# Patient Record
Sex: FEMALE | Race: WHITE | NOT HISPANIC OR LATINO | Employment: OTHER | RURAL
[De-identification: names, ages, dates, MRNs, and addresses within clinical notes are randomized per-mention and may not be internally consistent; named-entity substitution may affect disease eponyms.]

---

## 2020-06-18 ENCOUNTER — HISTORICAL (OUTPATIENT)
Dept: ADMINISTRATIVE | Facility: HOSPITAL | Age: 70
End: 2020-06-18

## 2021-07-15 ENCOUNTER — OFFICE VISIT (OUTPATIENT)
Dept: FAMILY MEDICINE | Facility: CLINIC | Age: 71
End: 2021-07-15
Payer: MEDICARE

## 2021-07-15 VITALS
RESPIRATION RATE: 18 BRPM | BODY MASS INDEX: 28.53 KG/M2 | OXYGEN SATURATION: 99 % | HEIGHT: 63 IN | TEMPERATURE: 99 F | HEART RATE: 78 BPM | DIASTOLIC BLOOD PRESSURE: 72 MMHG | SYSTOLIC BLOOD PRESSURE: 123 MMHG | WEIGHT: 161 LBS

## 2021-07-15 DIAGNOSIS — I10 HYPERTENSION, UNSPECIFIED TYPE: Primary | ICD-10-CM

## 2021-07-15 DIAGNOSIS — L01.00 IMPETIGO: ICD-10-CM

## 2021-07-15 DIAGNOSIS — E78.5 HYPERLIPIDEMIA, UNSPECIFIED HYPERLIPIDEMIA TYPE: ICD-10-CM

## 2021-07-15 LAB
ALBUMIN SERPL BCP-MCNC: 4 G/DL (ref 3.5–5)
ALBUMIN/GLOB SERPL: 1.1 {RATIO}
ALP SERPL-CCNC: 69 U/L (ref 55–142)
ALT SERPL W P-5'-P-CCNC: 28 U/L (ref 13–56)
ANION GAP SERPL CALCULATED.3IONS-SCNC: 11 MMOL/L (ref 7–16)
AST SERPL W P-5'-P-CCNC: 23 U/L (ref 15–37)
BASOPHILS # BLD AUTO: 0.09 K/UL (ref 0–0.2)
BASOPHILS NFR BLD AUTO: 1 % (ref 0–1)
BILIRUB SERPL-MCNC: 0.6 MG/DL (ref 0–1.2)
BUN SERPL-MCNC: 14 MG/DL (ref 7–18)
BUN/CREAT SERPL: 12 (ref 6–20)
CALCIUM SERPL-MCNC: 9.4 MG/DL (ref 8.5–10.1)
CHLORIDE SERPL-SCNC: 103 MMOL/L (ref 98–107)
CHOLEST SERPL-MCNC: 134 MG/DL (ref 0–200)
CHOLEST/HDLC SERPL: 2.4 {RATIO}
CO2 SERPL-SCNC: 31 MMOL/L (ref 21–32)
CREAT SERPL-MCNC: 1.16 MG/DL (ref 0.55–1.02)
DIFFERENTIAL METHOD BLD: ABNORMAL
EOSINOPHIL # BLD AUTO: 0.18 K/UL (ref 0–0.5)
EOSINOPHIL NFR BLD AUTO: 2 % (ref 1–4)
ERYTHROCYTE [DISTWIDTH] IN BLOOD BY AUTOMATED COUNT: 12.9 % (ref 11.5–14.5)
GLOBULIN SER-MCNC: 3.6 G/DL (ref 2–4)
GLUCOSE SERPL-MCNC: 89 MG/DL (ref 74–106)
HCT VFR BLD AUTO: 42.4 % (ref 38–47)
HDLC SERPL-MCNC: 55 MG/DL (ref 40–60)
HGB BLD-MCNC: 14.2 G/DL (ref 12–16)
IMM GRANULOCYTES # BLD AUTO: 0.02 K/UL (ref 0–0.04)
IMM GRANULOCYTES NFR BLD: 0.2 % (ref 0–0.4)
LDLC SERPL CALC-MCNC: 49 MG/DL
LDLC/HDLC SERPL: 0.9 {RATIO}
LYMPHOCYTES # BLD AUTO: 2.54 K/UL (ref 1–4.8)
LYMPHOCYTES NFR BLD AUTO: 28.2 % (ref 27–41)
MCH RBC QN AUTO: 29.1 PG (ref 27–31)
MCHC RBC AUTO-ENTMCNC: 33.5 G/DL (ref 32–36)
MCV RBC AUTO: 86.9 FL (ref 80–96)
MONOCYTES # BLD AUTO: 0.83 K/UL (ref 0–0.8)
MONOCYTES NFR BLD AUTO: 9.2 % (ref 2–6)
MPC BLD CALC-MCNC: 12.2 FL (ref 9.4–12.4)
NEUTROPHILS # BLD AUTO: 5.35 K/UL (ref 1.8–7.7)
NEUTROPHILS NFR BLD AUTO: 59.4 % (ref 53–65)
NONHDLC SERPL-MCNC: 79 MG/DL
NRBC # BLD AUTO: 0 X10E3/UL
NRBC, AUTO (.00): 0 %
PLATELET # BLD AUTO: 218 K/UL (ref 150–400)
POTASSIUM SERPL-SCNC: 3.4 MMOL/L (ref 3.5–5.1)
PROT SERPL-MCNC: 7.6 G/DL (ref 6.4–8.2)
RBC # BLD AUTO: 4.88 M/UL (ref 4.2–5.4)
SODIUM SERPL-SCNC: 142 MMOL/L (ref 136–145)
TRIGL SERPL-MCNC: 152 MG/DL (ref 35–150)
VLDLC SERPL-MCNC: 30 MG/DL
WBC # BLD AUTO: 9.01 K/UL (ref 4.5–11)

## 2021-07-15 PROCEDURE — 99214 OFFICE O/P EST MOD 30 MIN: CPT | Mod: ,,, | Performed by: FAMILY MEDICINE

## 2021-07-15 PROCEDURE — 80061 LIPID PANEL: ICD-10-PCS | Mod: ,,, | Performed by: CLINICAL MEDICAL LABORATORY

## 2021-07-15 PROCEDURE — 85025 COMPLETE CBC W/AUTO DIFF WBC: CPT | Mod: ,,, | Performed by: CLINICAL MEDICAL LABORATORY

## 2021-07-15 PROCEDURE — 99214 PR OFFICE/OUTPT VISIT, EST, LEVL IV, 30-39 MIN: ICD-10-PCS | Mod: ,,, | Performed by: FAMILY MEDICINE

## 2021-07-15 PROCEDURE — 80053 COMPREHEN METABOLIC PANEL: CPT | Mod: ,,, | Performed by: CLINICAL MEDICAL LABORATORY

## 2021-07-15 PROCEDURE — 85025 CBC WITH DIFFERENTIAL: ICD-10-PCS | Mod: ,,, | Performed by: CLINICAL MEDICAL LABORATORY

## 2021-07-15 PROCEDURE — 80053 COMPREHENSIVE METABOLIC PANEL: ICD-10-PCS | Mod: ,,, | Performed by: CLINICAL MEDICAL LABORATORY

## 2021-07-15 PROCEDURE — 80061 LIPID PANEL: CPT | Mod: ,,, | Performed by: CLINICAL MEDICAL LABORATORY

## 2021-07-15 RX ORDER — CHLORTHALIDONE 25 MG/1
25 TABLET ORAL EVERY MORNING
COMMUNITY
Start: 2021-04-11 | End: 2021-07-15 | Stop reason: SDUPTHER

## 2021-07-15 RX ORDER — CHLORTHALIDONE 25 MG/1
25 TABLET ORAL EVERY MORNING
Qty: 90 TABLET | Refills: 1 | Status: SHIPPED | OUTPATIENT
Start: 2021-07-15 | End: 2022-01-19 | Stop reason: SDUPTHER

## 2021-07-15 RX ORDER — ROSUVASTATIN CALCIUM 10 MG/1
10 TABLET, COATED ORAL NIGHTLY
COMMUNITY
Start: 2021-04-11 | End: 2021-07-15 | Stop reason: SDUPTHER

## 2021-07-15 RX ORDER — SULFAMETHOXAZOLE AND TRIMETHOPRIM 800; 160 MG/1; MG/1
1 TABLET ORAL 2 TIMES DAILY
Qty: 20 TABLET | Refills: 0 | Status: SHIPPED | OUTPATIENT
Start: 2021-07-15 | End: 2022-01-19

## 2021-07-15 RX ORDER — ROSUVASTATIN CALCIUM 10 MG/1
10 TABLET, COATED ORAL NIGHTLY
Qty: 90 TABLET | Refills: 1 | Status: SHIPPED | OUTPATIENT
Start: 2021-07-15 | End: 2022-01-19 | Stop reason: SDUPTHER

## 2022-01-19 ENCOUNTER — OFFICE VISIT (OUTPATIENT)
Dept: FAMILY MEDICINE | Facility: CLINIC | Age: 72
End: 2022-01-19
Payer: MEDICARE

## 2022-01-19 VITALS
DIASTOLIC BLOOD PRESSURE: 73 MMHG | TEMPERATURE: 99 F | BODY MASS INDEX: 28.89 KG/M2 | SYSTOLIC BLOOD PRESSURE: 134 MMHG | HEIGHT: 62 IN | OXYGEN SATURATION: 97 % | WEIGHT: 157 LBS | RESPIRATION RATE: 20 BRPM | HEART RATE: 83 BPM

## 2022-01-19 DIAGNOSIS — I10 HYPERTENSION, UNSPECIFIED TYPE: ICD-10-CM

## 2022-01-19 DIAGNOSIS — I10 PRIMARY HYPERTENSION: Primary | ICD-10-CM

## 2022-01-19 DIAGNOSIS — E78.5 HYPERLIPIDEMIA, UNSPECIFIED HYPERLIPIDEMIA TYPE: ICD-10-CM

## 2022-01-19 DIAGNOSIS — E78.2 MIXED HYPERLIPIDEMIA: ICD-10-CM

## 2022-01-19 DIAGNOSIS — Z12.31 ENCOUNTER FOR SCREENING MAMMOGRAM FOR MALIGNANT NEOPLASM OF BREAST: ICD-10-CM

## 2022-01-19 LAB
ALBUMIN SERPL BCP-MCNC: 3.1 G/DL (ref 3.5–5)
ALBUMIN/GLOB SERPL: 0.8 {RATIO}
ALP SERPL-CCNC: 65 U/L (ref 55–142)
ALT SERPL W P-5'-P-CCNC: 33 U/L (ref 13–56)
ANION GAP SERPL CALCULATED.3IONS-SCNC: 5 MMOL/L (ref 7–16)
AST SERPL W P-5'-P-CCNC: 28 U/L (ref 15–37)
BASOPHILS # BLD AUTO: 0.05 K/UL (ref 0–0.2)
BASOPHILS NFR BLD AUTO: 0.7 % (ref 0–1)
BILIRUB SERPL-MCNC: 0.6 MG/DL (ref 0–1.2)
BUN SERPL-MCNC: 15 MG/DL (ref 7–18)
BUN/CREAT SERPL: 17 (ref 6–20)
CALCIUM SERPL-MCNC: 9.1 MG/DL (ref 8.5–10.1)
CHLORIDE SERPL-SCNC: 101 MMOL/L (ref 98–107)
CHOLEST SERPL-MCNC: 124 MG/DL (ref 0–200)
CHOLEST/HDLC SERPL: 3.1 {RATIO}
CO2 SERPL-SCNC: 36 MMOL/L (ref 21–32)
CREAT SERPL-MCNC: 0.89 MG/DL (ref 0.55–1.02)
DIFFERENTIAL METHOD BLD: ABNORMAL
EOSINOPHIL # BLD AUTO: 0.11 K/UL (ref 0–0.5)
EOSINOPHIL NFR BLD AUTO: 1.5 % (ref 1–4)
ERYTHROCYTE [DISTWIDTH] IN BLOOD BY AUTOMATED COUNT: 12.7 % (ref 11.5–14.5)
GLOBULIN SER-MCNC: 3.9 G/DL (ref 2–4)
GLUCOSE SERPL-MCNC: 131 MG/DL (ref 74–106)
HCT VFR BLD AUTO: 40.8 % (ref 38–47)
HDLC SERPL-MCNC: 40 MG/DL (ref 40–60)
HGB BLD-MCNC: 13.8 G/DL (ref 12–16)
IMM GRANULOCYTES # BLD AUTO: 0.03 K/UL (ref 0–0.04)
IMM GRANULOCYTES NFR BLD: 0.4 % (ref 0–0.4)
LDLC SERPL CALC-MCNC: 42 MG/DL
LDLC/HDLC SERPL: 1.1 {RATIO}
LYMPHOCYTES # BLD AUTO: 1.91 K/UL (ref 1–4.8)
LYMPHOCYTES NFR BLD AUTO: 25.4 % (ref 27–41)
MCH RBC QN AUTO: 29.2 PG (ref 27–31)
MCHC RBC AUTO-ENTMCNC: 33.8 G/DL (ref 32–36)
MCV RBC AUTO: 86.3 FL (ref 80–96)
MONOCYTES # BLD AUTO: 0.66 K/UL (ref 0–0.8)
MONOCYTES NFR BLD AUTO: 8.8 % (ref 2–6)
MPC BLD CALC-MCNC: 12.7 FL (ref 9.4–12.4)
NEUTROPHILS # BLD AUTO: 4.76 K/UL (ref 1.8–7.7)
NEUTROPHILS NFR BLD AUTO: 63.2 % (ref 53–65)
NONHDLC SERPL-MCNC: 84 MG/DL
NRBC # BLD AUTO: 0 X10E3/UL
NRBC, AUTO (.00): 0 %
PLATELET # BLD AUTO: 214 K/UL (ref 150–400)
POTASSIUM SERPL-SCNC: 3.2 MMOL/L (ref 3.5–5.1)
PROT SERPL-MCNC: 7 G/DL (ref 6.4–8.2)
RBC # BLD AUTO: 4.73 M/UL (ref 4.2–5.4)
SODIUM SERPL-SCNC: 139 MMOL/L (ref 136–145)
TRIGL SERPL-MCNC: 210 MG/DL (ref 35–150)
VLDLC SERPL-MCNC: 42 MG/DL
WBC # BLD AUTO: 7.52 K/UL (ref 4.5–11)

## 2022-01-19 PROCEDURE — 85025 CBC WITH DIFFERENTIAL: ICD-10-PCS | Mod: ,,, | Performed by: CLINICAL MEDICAL LABORATORY

## 2022-01-19 PROCEDURE — 99214 OFFICE O/P EST MOD 30 MIN: CPT | Mod: ,,, | Performed by: FAMILY MEDICINE

## 2022-01-19 PROCEDURE — 80061 LIPID PANEL: CPT | Mod: ,,, | Performed by: CLINICAL MEDICAL LABORATORY

## 2022-01-19 PROCEDURE — 80053 COMPREHENSIVE METABOLIC PANEL: ICD-10-PCS | Mod: ,,, | Performed by: CLINICAL MEDICAL LABORATORY

## 2022-01-19 PROCEDURE — 99214 PR OFFICE/OUTPT VISIT, EST, LEVL IV, 30-39 MIN: ICD-10-PCS | Mod: ,,, | Performed by: FAMILY MEDICINE

## 2022-01-19 PROCEDURE — 80053 COMPREHEN METABOLIC PANEL: CPT | Mod: ,,, | Performed by: CLINICAL MEDICAL LABORATORY

## 2022-01-19 PROCEDURE — 85025 COMPLETE CBC W/AUTO DIFF WBC: CPT | Mod: ,,, | Performed by: CLINICAL MEDICAL LABORATORY

## 2022-01-19 PROCEDURE — 80061 LIPID PANEL: ICD-10-PCS | Mod: ,,, | Performed by: CLINICAL MEDICAL LABORATORY

## 2022-01-19 RX ORDER — CHLORTHALIDONE 25 MG/1
25 TABLET ORAL EVERY MORNING
Qty: 90 TABLET | Refills: 1 | Status: CANCELLED | OUTPATIENT
Start: 2022-01-19

## 2022-01-19 RX ORDER — ROSUVASTATIN CALCIUM 10 MG/1
10 TABLET, COATED ORAL NIGHTLY
Qty: 90 TABLET | Refills: 1 | Status: SHIPPED | OUTPATIENT
Start: 2022-01-19 | End: 2022-07-14 | Stop reason: SDUPTHER

## 2022-01-19 RX ORDER — ROSUVASTATIN CALCIUM 10 MG/1
10 TABLET, COATED ORAL NIGHTLY
Qty: 90 TABLET | Refills: 1 | Status: CANCELLED | OUTPATIENT
Start: 2022-01-19

## 2022-01-19 RX ORDER — CHLORTHALIDONE 25 MG/1
25 TABLET ORAL EVERY MORNING
Qty: 90 TABLET | Refills: 1 | Status: SHIPPED | OUTPATIENT
Start: 2022-01-19 | End: 2022-07-14 | Stop reason: SDUPTHER

## 2022-01-19 NOTE — PROGRESS NOTES
New Clinic Note    Dayana Bae is a 71 y.o. female     CC:   Chief Complaint   Patient presents with    Annual Exam    Hypertension    Hyperlipidemia    Follow-up     Here for her 6 month follow up appointment , fasting labs and prescriptions.        Subjective    History of Present Illness HPI        Presents to clinic for follow up on chronic health problems. She needs refills.     Hypertension:    Takes medications as directed: yes  Checks blood pressure outside of clinic: yes  On a statin: no  Cardiovascular exercise: yes  Heart healthy diet: no        Current Outpatient Medications:     chlorthalidone (HYGROTEN) 25 MG Tab, Take 1 tablet (25 mg total) by mouth every morning., Disp: 90 tablet, Rfl: 1    rosuvastatin (CRESTOR) 10 MG tablet, Take 1 tablet (10 mg total) by mouth every evening., Disp: 90 tablet, Rfl: 1     Past Medical History:   Diagnosis Date    Cataract     Hyperlipidemia     Hypertension         Family History   Problem Relation Age of Onset    Heart disease Mother     Heart disease Father     Hypertension Sister     Hypertension Brother         Past Surgical History:   Procedure Laterality Date    CARPAL TUNNEL RELEASE      TUBAL LIGATION          Social History     Socioeconomic History    Marital status:     Number of children: 2   Occupational History    Occupation: retired from [employer]   Tobacco Use    Smoking status: Never Smoker    Smokeless tobacco: Never Used   Substance and Sexual Activity    Alcohol use: Never    Drug use: Never    Sexual activity: Not Currently     Partners: Male        Review of Systems   Constitutional: Negative for activity change, appetite change, chills, fatigue and fever.   HENT: Negative for nasal congestion, ear pain, hearing loss, postnasal drip and sore throat.    Respiratory: Negative for cough, chest tightness, shortness of breath and wheezing.    Cardiovascular: Negative for chest pain, palpitations, leg swelling  "and claudication.   Gastrointestinal: Negative for abdominal pain, change in bowel habit, constipation, diarrhea, nausea, vomiting and change in bowel habit.   Genitourinary: Negative for dysuria.   Musculoskeletal: Negative for arthralgias, back pain, gait problem and myalgias.   Integumentary:  Negative for rash.   Neurological: Negative for weakness and headaches.   Psychiatric/Behavioral: Negative for suicidal ideas. The patient is not nervous/anxious.         /73   Pulse 83   Temp 99.4 °F (37.4 °C) (Oral)   Resp 20   Ht 5' 2" (1.575 m)   Wt 71.2 kg (157 lb)   SpO2 97%   BMI 28.72 kg/m²      Physical Exam  Vitals and nursing note reviewed.   Constitutional:       General: She is not in acute distress.     Appearance: Normal appearance. She is not ill-appearing.   Eyes:      Extraocular Movements: Extraocular movements intact.      Pupils: Pupils are equal, round, and reactive to light.   Cardiovascular:      Rate and Rhythm: Normal rate and regular rhythm.      Heart sounds: Normal heart sounds.   Pulmonary:      Effort: Pulmonary effort is normal.      Breath sounds: Normal breath sounds.   Abdominal:      General: Bowel sounds are normal.      Palpations: Abdomen is soft.   Musculoskeletal:         General: Normal range of motion.   Skin:     Findings: No rash.   Neurological:      General: No focal deficit present.      Mental Status: She is alert and oriented to person, place, and time. Mental status is at baseline.   Psychiatric:         Mood and Affect: Mood normal.         Behavior: Behavior normal.          Assessment and Plan      ICD-10-CM ICD-9-CM   1. Primary hypertension  I10 401.9   2. Mixed hyperlipidemia  E78.2 272.2   3. Encounter for screening mammogram for malignant neoplasm of breast  Z12.31 V76.12        Problem List Items Addressed This Visit        Cardiac/Vascular    Hypertension - Primary    Relevant Medications    chlorthalidone (HYGROTEN) 25 MG Tab    Other Relevant Orders "    Comprehensive Metabolic Panel    CBC Auto Differential    Lipid Panel    Hyperlipidemia    Relevant Medications    rosuvastatin (CRESTOR) 10 MG tablet      Other Visit Diagnoses     Encounter for screening mammogram for malignant neoplasm of breast        Relevant Orders    Mammo Digital Screening Bilat           Patient Instructions   1. Take Medications as directed  2. Monitor blood pressure outside of clinic  3. Eat a heart healthy diet and get plenty of cardiovascular exercise.          Follow up in about 6 months (around 7/19/2022).

## 2022-01-27 LAB — BCS RECOMMENDATION EXT: NORMAL

## 2022-02-07 DIAGNOSIS — R73.9 HYPERGLYCEMIA: Primary | ICD-10-CM

## 2022-03-11 DIAGNOSIS — Z71.89 COMPLEX CARE COORDINATION: ICD-10-CM

## 2022-03-16 ENCOUNTER — OFFICE VISIT (OUTPATIENT)
Dept: FAMILY MEDICINE | Facility: CLINIC | Age: 72
End: 2022-03-16
Payer: MEDICARE

## 2022-03-16 VITALS
DIASTOLIC BLOOD PRESSURE: 77 MMHG | SYSTOLIC BLOOD PRESSURE: 137 MMHG | BODY MASS INDEX: 27.71 KG/M2 | OXYGEN SATURATION: 98 % | WEIGHT: 156.38 LBS | RESPIRATION RATE: 20 BRPM | TEMPERATURE: 98 F | HEART RATE: 88 BPM | HEIGHT: 63 IN

## 2022-03-16 DIAGNOSIS — J40 BRONCHITIS: Primary | ICD-10-CM

## 2022-03-16 DIAGNOSIS — R52 BODY ACHES: ICD-10-CM

## 2022-03-16 DIAGNOSIS — R09.81 NASAL CONGESTION: ICD-10-CM

## 2022-03-16 DIAGNOSIS — R05.9 COUGH: ICD-10-CM

## 2022-03-16 DIAGNOSIS — R50.9 FEVER, UNSPECIFIED FEVER CAUSE: ICD-10-CM

## 2022-03-16 DIAGNOSIS — R51.9 NONINTRACTABLE HEADACHE, UNSPECIFIED CHRONICITY PATTERN, UNSPECIFIED HEADACHE TYPE: ICD-10-CM

## 2022-03-16 LAB
CTP QC/QA: YES
FLUAV AG NPH QL: NEGATIVE
FLUBV AG NPH QL: NEGATIVE
SARS-COV-2 AG RESP QL IA.RAPID: NEGATIVE

## 2022-03-16 PROCEDURE — 87428 SARSCOV & INF VIR A&B AG IA: CPT | Mod: RHCUB | Performed by: NURSE PRACTITIONER

## 2022-03-16 PROCEDURE — 96372 PR INJECTION,THERAP/PROPH/DIAG2ST, IM OR SUBCUT: ICD-10-PCS | Mod: ,,, | Performed by: NURSE PRACTITIONER

## 2022-03-16 PROCEDURE — 99214 PR OFFICE/OUTPT VISIT, EST, LEVL IV, 30-39 MIN: ICD-10-PCS | Mod: ,,, | Performed by: NURSE PRACTITIONER

## 2022-03-16 PROCEDURE — 99214 OFFICE O/P EST MOD 30 MIN: CPT | Mod: ,,, | Performed by: NURSE PRACTITIONER

## 2022-03-16 PROCEDURE — 96372 THER/PROPH/DIAG INJ SC/IM: CPT | Mod: ,,, | Performed by: NURSE PRACTITIONER

## 2022-03-16 RX ORDER — CEFTRIAXONE 1 G/1
1 INJECTION, POWDER, FOR SOLUTION INTRAMUSCULAR; INTRAVENOUS
Status: COMPLETED | OUTPATIENT
Start: 2022-03-16 | End: 2022-03-16

## 2022-03-16 RX ORDER — ALBUTEROL SULFATE 90 UG/1
2 AEROSOL, METERED RESPIRATORY (INHALATION) EVERY 6 HOURS PRN
Qty: 18 G | Refills: 0 | Status: SHIPPED | OUTPATIENT
Start: 2022-03-16 | End: 2022-07-14

## 2022-03-16 RX ORDER — AZITHROMYCIN 250 MG/1
TABLET, FILM COATED ORAL
Qty: 6 TABLET | Refills: 0 | Status: SHIPPED | OUTPATIENT
Start: 2022-03-16 | End: 2022-07-14

## 2022-03-16 RX ORDER — BETAMETHASONE SODIUM PHOSPHATE AND BETAMETHASONE ACETATE 3; 3 MG/ML; MG/ML
6 INJECTION, SUSPENSION INTRA-ARTICULAR; INTRALESIONAL; INTRAMUSCULAR; SOFT TISSUE
Status: COMPLETED | OUTPATIENT
Start: 2022-03-16 | End: 2022-03-16

## 2022-03-16 RX ADMIN — CEFTRIAXONE 1 G: 1 INJECTION, POWDER, FOR SOLUTION INTRAMUSCULAR; INTRAVENOUS at 11:03

## 2022-03-16 RX ADMIN — BETAMETHASONE SODIUM PHOSPHATE AND BETAMETHASONE ACETATE 6 MG: 3; 3 INJECTION, SUSPENSION INTRA-ARTICULAR; INTRALESIONAL; INTRAMUSCULAR; SOFT TISSUE at 10:03

## 2022-03-16 NOTE — PROGRESS NOTES
Sammi Butt DNP, MARIO    [address] [place] [address]     PATIENT NAME: Dayana Bae  : 1950  DATE: 3/16/22  MRN: [id number]      Billing Provider: Sammi Butt DNP, FNP  Level of Service:   Patient PCP Information     Provider PCP Type    Meghna Ramires MD General          Reason for Visit / Chief Complaint: Cough, Nasal Congestion, and Fatigue (Symptoms started about a week ago. Complain of dry non productive cough, nasal congestion. Patient states she had a negative home covid test.)       Update PCP  Update Chief Complaint         History of Present Illness / Problem Focused Workflow     Dayana Bae presents to the clinic with Cough, Nasal Congestion, and Fatigue (Symptoms started about a week ago. Complain of dry non productive cough, nasal congestion. Patient states she had a negative home covid test.)     Pt c/o dry cough and fatigue x 1 week. Pt c/o nasal congestion. Denies any fever.       Review of Systems     Review of Systems   Constitutional: Positive for fatigue. Negative for appetite change, fever and unexpected weight change.   HENT: Positive for nasal congestion. Negative for hearing loss.    Eyes: Negative for visual disturbance.   Respiratory: Positive for cough. Negative for shortness of breath.    Cardiovascular: Negative for chest pain.   Gastrointestinal: Negative for abdominal pain, constipation, diarrhea, nausea and vomiting.   Genitourinary: Negative for dysuria.   Musculoskeletal: Negative for back pain.   Psychiatric/Behavioral: Negative for sleep disturbance.        Medical / Social / Family History     Past Medical History:   Diagnosis Date    Cataract     Hyperlipidemia     Hypertension        Past Surgical History:   Procedure Laterality Date    CARPAL TUNNEL RELEASE      TUBAL LIGATION         Social History  Ms. Dayana Bae  reports that she has never smoked. She has never used smokeless  tobacco. She reports that she does not drink alcohol and does not use drugs.    Family History  Ms. Dayana Bae's family history includes Heart disease in her father and mother; Hypertension in her brother and sister.    Medications and Allergies     Medications  Outpatient Medications Marked as Taking for the 3/16/22 encounter (Office Visit) with Sammi Butt DNP, FNP   Medication Sig Dispense Refill    chlorthalidone (HYGROTEN) 25 MG Tab Take 1 tablet (25 mg total) by mouth every morning. 90 tablet 1    rosuvastatin (CRESTOR) 10 MG tablet Take 1 tablet (10 mg total) by mouth every evening. 90 tablet 1     Current Facility-Administered Medications for the 3/16/22 encounter (Office Visit) with Sammi Butt DNP, FNP   Medication Dose Route Frequency Provider Last Rate Last Admin    betamethasone acetate-betamethasone sodium phosphate injection 6 mg  6 mg Intramuscular 1 time in Clinic/HOD Sammi Butt DNP, FNP        cefTRIAXone injection 1 g  1 g Intramuscular 1 time in Clinic/HOD Sammi Butt DNP, FNP           Allergies  Review of patient's allergies indicates:  No Known Allergies    Physical Examination     Vitals:    03/16/22 0942   BP: 137/77   Pulse: 88   Resp: 20   Temp: 98.3 °F (36.8 °C)     Physical Exam  Vitals and nursing note reviewed.   Constitutional:       General: She is not in acute distress.  HENT:      Nose: Nose normal.      Mouth/Throat:      Mouth: Mucous membranes are moist.   Eyes:      Pupils: Pupils are equal, round, and reactive to light.   Cardiovascular:      Rate and Rhythm: Normal rate and regular rhythm.      Pulses: Normal pulses.      Heart sounds: Normal heart sounds. No murmur heard.  Pulmonary:      Effort: Pulmonary effort is normal. No respiratory distress.      Breath sounds: Examination of the left-upper field reveals rhonchi. Examination of the left-middle field reveals rhonchi. Rhonchi present. No wheezing or rales.   Chest:      Chest wall: No  tenderness.   Abdominal:      General: Bowel sounds are normal.      Palpations: Abdomen is soft.   Musculoskeletal:         General: Normal range of motion.      Cervical back: Normal range of motion and neck supple.      Right lower leg: No edema.      Left lower leg: No edema.   Skin:     General: Skin is warm and dry.   Neurological:      General: No focal deficit present.      Mental Status: She is alert and oriented to person, place, and time.          Assessment and Plan (including Health Maintenance)      Problem List  [employer]  Document Outside HM   :    Plan:         Health Maintenance Due   Topic Date Due    Mammogram  Never done    Pneumococcal Vaccines (Age 65+) (1 of 1 - PPSV23) Never done       Problem List Items Addressed This Visit    None     Visit Diagnoses     Bronchitis    -  Primary    Relevant Medications    cefTRIAXone injection 1 g    betamethasone acetate-betamethasone sodium phosphate injection 6 mg    azithromycin (ZITHROMAX Z-NATHANAEL) 250 MG tablet    albuterol (VENTOLIN HFA) 90 mcg/actuation inhaler    Cough        Relevant Orders    POCT SARS-COV2 (COVID) with Flu Antigen (Completed)    Nasal congestion        Relevant Orders    POCT SARS-COV2 (COVID) with Flu Antigen (Completed)    Nonintractable headache, unspecified chronicity pattern, unspecified headache type        Relevant Orders    POCT SARS-COV2 (COVID) with Flu Antigen (Completed)    Body aches        Relevant Orders    POCT SARS-COV2 (COVID) with Flu Antigen (Completed)    Fever, unspecified fever cause        Relevant Orders    POCT SARS-COV2 (COVID) with Flu Antigen (Completed)        Bronchitis  -     cefTRIAXone injection 1 g  -     betamethasone acetate-betamethasone sodium phosphate injection 6 mg  -     azithromycin (ZITHROMAX Z-NATHANAEL) 250 MG tablet; Take 2 tablets on Day 1 and 1 tablet daily for next 4 days.  Dispense: 6 tablet; Refill: 0  -     albuterol (VENTOLIN HFA) 90 mcg/actuation inhaler; Inhale 2 puffs into the  lungs every 6 (six) hours as needed for Wheezing. Rescue  Dispense: 18 g; Refill: 0    Cough  -     POCT SARS-COV2 (COVID) with Flu Antigen    Nasal congestion  -     POCT SARS-COV2 (COVID) with Flu Antigen    Nonintractable headache, unspecified chronicity pattern, unspecified headache type  -     POCT SARS-COV2 (COVID) with Flu Antigen    Body aches  -     POCT SARS-COV2 (COVID) with Flu Antigen    Fever, unspecified fever cause  -     POCT SARS-COV2 (COVID) with Flu Antigen       Health Maintenance Topics with due status: Not Due       Topic Last Completion Date    Lipid Panel 01/19/2022    DEXA Scan Not Due           Future Appointments   Date Time Provider Department Center   7/20/2022  8:00 AM Meghna Ramires MD [facility]        Follow up if symptoms worsen or fail to improve.     Signature:  Sammi Butt DNP, FNP  [address] [place] [address]  Phone #: [phone]  Fax #: [phone]    Date of encounter: 3/16/22    Patient Instructions   Take meds as prescribed. Use inhaler every 4 hours for next 24 hours while awake and then use as needed. Increase fluid intake. Follow up for any worsening in symptoms or no improvement with current meds.

## 2022-03-16 NOTE — PATIENT INSTRUCTIONS
Take meds as prescribed. Use inhaler every 4 hours for next 24 hours while awake and then use as needed. Increase fluid intake. Follow up for any worsening in symptoms or no improvement with current meds.

## 2022-07-14 ENCOUNTER — OFFICE VISIT (OUTPATIENT)
Dept: FAMILY MEDICINE | Facility: CLINIC | Age: 72
End: 2022-07-14
Payer: MEDICARE

## 2022-07-14 VITALS
BODY MASS INDEX: 28.85 KG/M2 | RESPIRATION RATE: 20 BRPM | SYSTOLIC BLOOD PRESSURE: 157 MMHG | HEIGHT: 63 IN | HEART RATE: 73 BPM | OXYGEN SATURATION: 99 % | WEIGHT: 162.81 LBS | DIASTOLIC BLOOD PRESSURE: 78 MMHG

## 2022-07-14 DIAGNOSIS — M81.0 AGE-RELATED OSTEOPOROSIS WITHOUT CURRENT PATHOLOGICAL FRACTURE: Chronic | ICD-10-CM

## 2022-07-14 DIAGNOSIS — I10 PRIMARY HYPERTENSION: Primary | ICD-10-CM

## 2022-07-14 DIAGNOSIS — E78.2 MIXED HYPERLIPIDEMIA: ICD-10-CM

## 2022-07-14 PROBLEM — E78.5 HYPERLIPIDEMIA: Chronic | Status: ACTIVE | Noted: 2021-07-15

## 2022-07-14 LAB
ALBUMIN SERPL BCP-MCNC: 4.3 G/DL (ref 3.5–5)
ALBUMIN/GLOB SERPL: 1.3 {RATIO}
ALP SERPL-CCNC: 61 U/L (ref 55–142)
ALT SERPL W P-5'-P-CCNC: 27 U/L (ref 13–56)
ANION GAP SERPL CALCULATED.3IONS-SCNC: 13 MMOL/L (ref 7–16)
AST SERPL W P-5'-P-CCNC: 20 U/L (ref 15–37)
BASOPHILS # BLD AUTO: 0.08 K/UL (ref 0–0.2)
BASOPHILS NFR BLD AUTO: 1.2 % (ref 0–1)
BILIRUB SERPL-MCNC: 0.8 MG/DL (ref 0–1.2)
BUN SERPL-MCNC: 10 MG/DL (ref 7–18)
BUN/CREAT SERPL: 10 (ref 6–20)
CALCIUM SERPL-MCNC: 9.8 MG/DL (ref 8.5–10.1)
CHLORIDE SERPL-SCNC: 103 MMOL/L (ref 98–107)
CHOLEST SERPL-MCNC: 158 MG/DL (ref 0–200)
CHOLEST/HDLC SERPL: 2.6 {RATIO}
CO2 SERPL-SCNC: 30 MMOL/L (ref 21–32)
CREAT SERPL-MCNC: 1 MG/DL (ref 0.55–1.02)
CREAT UR-MCNC: 58 MG/DL (ref 28–219)
DIFFERENTIAL METHOD BLD: ABNORMAL
EOSINOPHIL # BLD AUTO: 0.16 K/UL (ref 0–0.5)
EOSINOPHIL NFR BLD AUTO: 2.4 % (ref 1–4)
ERYTHROCYTE [DISTWIDTH] IN BLOOD BY AUTOMATED COUNT: 13.2 % (ref 11.5–14.5)
GLOBULIN SER-MCNC: 3.4 G/DL (ref 2–4)
GLUCOSE SERPL-MCNC: 100 MG/DL (ref 74–106)
HCT VFR BLD AUTO: 45.5 % (ref 38–47)
HDLC SERPL-MCNC: 61 MG/DL (ref 40–60)
HGB BLD-MCNC: 15.1 G/DL (ref 12–16)
IMM GRANULOCYTES # BLD AUTO: 0.01 K/UL (ref 0–0.04)
IMM GRANULOCYTES NFR BLD: 0.2 % (ref 0–0.4)
LDLC SERPL CALC-MCNC: 52 MG/DL
LDLC/HDLC SERPL: 0.9 {RATIO}
LYMPHOCYTES # BLD AUTO: 2.89 K/UL (ref 1–4.8)
LYMPHOCYTES NFR BLD AUTO: 44.2 % (ref 27–41)
MCH RBC QN AUTO: 29.3 PG (ref 27–31)
MCHC RBC AUTO-ENTMCNC: 33.2 G/DL (ref 32–36)
MCV RBC AUTO: 88.2 FL (ref 80–96)
MICROALBUMIN UR-MCNC: <0.5 MG/DL (ref 0–2.8)
MICROALBUMIN/CREAT RATIO PNL UR: <8.6 MG/G (ref 0–30)
MONOCYTES # BLD AUTO: 0.61 K/UL (ref 0–0.8)
MONOCYTES NFR BLD AUTO: 9.3 % (ref 2–6)
MPC BLD CALC-MCNC: 11.9 FL (ref 9.4–12.4)
NEUTROPHILS # BLD AUTO: 2.79 K/UL (ref 1.8–7.7)
NEUTROPHILS NFR BLD AUTO: 42.7 % (ref 53–65)
NONHDLC SERPL-MCNC: 97 MG/DL
NRBC # BLD AUTO: 0 X10E3/UL
NRBC, AUTO (.00): 0 %
PLATELET # BLD AUTO: 218 K/UL (ref 150–400)
POTASSIUM SERPL-SCNC: 3.5 MMOL/L (ref 3.5–5.1)
PROT SERPL-MCNC: 7.7 G/DL (ref 6.4–8.2)
RBC # BLD AUTO: 5.16 M/UL (ref 4.2–5.4)
SODIUM SERPL-SCNC: 142 MMOL/L (ref 136–145)
TRIGL SERPL-MCNC: 225 MG/DL (ref 35–150)
VLDLC SERPL-MCNC: 45 MG/DL
WBC # BLD AUTO: 6.54 K/UL (ref 4.5–11)

## 2022-07-14 PROCEDURE — 82043 UR ALBUMIN QUANTITATIVE: CPT | Mod: ,,, | Performed by: CLINICAL MEDICAL LABORATORY

## 2022-07-14 PROCEDURE — 81003 URINALYSIS AUTO W/O SCOPE: CPT | Mod: RHCUB | Performed by: NURSE PRACTITIONER

## 2022-07-14 PROCEDURE — 82043 MICROALBUMIN / CREATININE RATIO URINE: ICD-10-PCS | Mod: ,,, | Performed by: CLINICAL MEDICAL LABORATORY

## 2022-07-14 PROCEDURE — 80061 LIPID PANEL: CPT | Mod: ,,, | Performed by: CLINICAL MEDICAL LABORATORY

## 2022-07-14 PROCEDURE — 85025 COMPLETE CBC W/AUTO DIFF WBC: CPT | Mod: ,,, | Performed by: CLINICAL MEDICAL LABORATORY

## 2022-07-14 PROCEDURE — 80061 LIPID PANEL: ICD-10-PCS | Mod: ,,, | Performed by: CLINICAL MEDICAL LABORATORY

## 2022-07-14 PROCEDURE — 80053 COMPREHENSIVE METABOLIC PANEL: ICD-10-PCS | Mod: ,,, | Performed by: CLINICAL MEDICAL LABORATORY

## 2022-07-14 PROCEDURE — 82570 MICROALBUMIN / CREATININE RATIO URINE: ICD-10-PCS | Mod: ,,, | Performed by: CLINICAL MEDICAL LABORATORY

## 2022-07-14 PROCEDURE — 99214 PR OFFICE/OUTPT VISIT, EST, LEVL IV, 30-39 MIN: ICD-10-PCS | Mod: ,,, | Performed by: NURSE PRACTITIONER

## 2022-07-14 PROCEDURE — 99214 OFFICE O/P EST MOD 30 MIN: CPT | Mod: ,,, | Performed by: NURSE PRACTITIONER

## 2022-07-14 PROCEDURE — 82570 ASSAY OF URINE CREATININE: CPT | Mod: ,,, | Performed by: CLINICAL MEDICAL LABORATORY

## 2022-07-14 PROCEDURE — 80053 COMPREHEN METABOLIC PANEL: CPT | Mod: ,,, | Performed by: CLINICAL MEDICAL LABORATORY

## 2022-07-14 PROCEDURE — 85025 CBC WITH DIFFERENTIAL: ICD-10-PCS | Mod: ,,, | Performed by: CLINICAL MEDICAL LABORATORY

## 2022-07-14 RX ORDER — ROSUVASTATIN CALCIUM 10 MG/1
10 TABLET, COATED ORAL NIGHTLY
Qty: 90 TABLET | Refills: 1 | Status: SHIPPED | OUTPATIENT
Start: 2022-07-14 | End: 2023-01-18

## 2022-07-14 RX ORDER — CHLORTHALIDONE 25 MG/1
25 TABLET ORAL EVERY MORNING
Qty: 90 TABLET | Refills: 1 | Status: SHIPPED | OUTPATIENT
Start: 2022-07-14 | End: 2023-01-18

## 2022-07-14 NOTE — PATIENT INSTRUCTIONS
Continue current medication regimen. Will call pt with lab results. Recommend monthly breast exams. Recommend exercise 30 minutes per day most days of the week. Follow up in 6 months for chronic medical problems. Follow up as needed.

## 2022-07-14 NOTE — PROGRESS NOTES
Sammi Butt DNP, FNP    [address] [place] [address]     PATIENT NAME: Dayana Bae  : 1950  DATE: 22  MRN: [id number]      Billing Provider: Sammi Butt DNP, FNP  Level of Service:   Patient PCP Information     Provider PCP Type    Sammi Butt DNP, FNP General          Reason for Visit / Chief Complaint: Medication Refill and Follow-up       Update PCP  Update Chief Complaint         History of Present Illness / Problem Focused Workflow     Dayana Bae presents to the clinic with Medication Refill and Follow-up     Pt here for evaluation of hypertension and hyperlipidemia. Pt denies any complaints. Pt has white coat syndrome.       Review of Systems     Review of Systems   Constitutional: Negative for activity change, appetite change, chills, fatigue, fever and unexpected weight change.   HENT: Negative for nasal congestion, ear pain, hearing loss, postnasal drip and sore throat.    Eyes: Negative for visual disturbance.   Respiratory: Negative for cough, chest tightness, shortness of breath and wheezing.    Cardiovascular: Negative for chest pain, palpitations, leg swelling and claudication.   Gastrointestinal: Negative for abdominal pain, change in bowel habit, constipation, diarrhea, nausea, vomiting and change in bowel habit.   Genitourinary: Negative for dysuria.   Musculoskeletal: Negative for arthralgias, back pain, gait problem and myalgias.   Integumentary:  Negative for rash.   Neurological: Negative for weakness and headaches.   Psychiatric/Behavioral: Negative for sleep disturbance and suicidal ideas. The patient is not nervous/anxious.         Medical / Social / Family History     Past Medical History:   Diagnosis Date    Cataract     Hyperlipidemia     Hypertension        Past Surgical History:   Procedure Laterality Date    CARPAL TUNNEL RELEASE      TUBAL LIGATION         Social History  Ms. Dayana Bae  " reports that she has never smoked. She has never used smokeless tobacco. She reports that she does not drink alcohol and does not use drugs.    Family History  Ms. Dayana Bae's family history includes Heart disease in her father and mother; Hypertension in her brother and sister.    Medications and Allergies     Medications  Outpatient Medications Marked as Taking for the 7/14/22 encounter (Office Visit) with Sammi Butt, DERICK, FNP   Medication Sig Dispense Refill    [DISCONTINUED] albuterol (VENTOLIN HFA) 90 mcg/actuation inhaler Inhale 2 puffs into the lungs every 6 (six) hours as needed for Wheezing. Rescue 18 g 0    [DISCONTINUED] chlorthalidone (HYGROTEN) 25 MG Tab Take 1 tablet (25 mg total) by mouth every morning. 90 tablet 1    [DISCONTINUED] rosuvastatin (CRESTOR) 10 MG tablet Take 1 tablet (10 mg total) by mouth every evening. 90 tablet 1       Allergies  Review of patient's allergies indicates:  No Known Allergies    Physical Examination     Vitals:    07/14/22 1038   BP: (!) 157/78   Pulse:    Resp:      Vitals:    07/14/22 1005 07/14/22 1008 07/14/22 1038   BP: (!) 160/77 (!) 157/70 (!) 157/78   BP Location: Right arm Left arm    Patient Position: Sitting Sitting    BP Method: Large (Automatic) Large (Automatic)    Pulse: 73     Resp: 20     SpO2: 99%     Weight: 73.8 kg (162 lb 12.8 oz)     Height: 5' 3" (1.6 m)       Physical Exam  Vitals and nursing note reviewed.   Constitutional:       General: She is not in acute distress.     Appearance: Normal appearance.   HENT:      Nose: Nose normal.      Mouth/Throat:      Mouth: Mucous membranes are moist.   Eyes:      Extraocular Movements: Extraocular movements intact.      Pupils: Pupils are equal, round, and reactive to light.   Cardiovascular:      Rate and Rhythm: Normal rate and regular rhythm.      Pulses: Normal pulses.      Heart sounds: Normal heart sounds. No murmur heard.  Pulmonary:      Effort: Pulmonary effort is normal. No " respiratory distress.      Breath sounds: Normal breath sounds. No wheezing, rhonchi or rales.   Chest:      Chest wall: No tenderness.   Abdominal:      General: Bowel sounds are normal.      Palpations: Abdomen is soft.   Musculoskeletal:         General: Normal range of motion.      Cervical back: Normal range of motion and neck supple.      Right lower leg: No edema.      Left lower leg: No edema.   Skin:     General: Skin is warm and dry.      Findings: No rash.   Neurological:      General: No focal deficit present.      Mental Status: She is alert and oriented to person, place, and time. Mental status is at baseline.   Psychiatric:         Mood and Affect: Mood normal.         Behavior: Behavior normal.          Assessment and Plan (including Health Maintenance)      Problem List  [employer]  Document Outside HM   :    Plan:         Health Maintenance Due   Topic Date Due    Hepatitis C Screening  Never done    Mammogram  Never done    Pneumococcal Vaccines (Age 65+) (1 - PCV) Never done       Problem List Items Addressed This Visit        Cardiac/Vascular    Hyperlipidemia (Chronic)    Relevant Medications    rosuvastatin (CRESTOR) 10 MG tablet    Hypertension - Primary (Chronic)    Relevant Medications    chlorthalidone (HYGROTEN) 25 MG Tab    Other Relevant Orders    Comprehensive Metabolic Panel    CBC Auto Differential    Lipid Panel    Microalbumin/Creatinine Ratio, Urine    POCT URINALYSIS W/O SCOPE       Orthopedic    Age-related osteoporosis without current pathological fracture (Chronic)    Overview     Bone density currently shows osteopenia after several years of treatment with Prolia. Pt has not had med in 2 years.                Primary hypertension  -     chlorthalidone (HYGROTEN) 25 MG Tab; Take 1 tablet (25 mg total) by mouth every morning.  Dispense: 90 tablet; Refill: 1  -     Comprehensive Metabolic Panel; Future; Expected date: 07/14/2022  -     CBC Auto Differential; Future; Expected  date: 07/14/2022  -     Lipid Panel; Future; Expected date: 07/14/2022  -     Microalbumin/Creatinine Ratio, Urine  -     POCT URINALYSIS W/O SCOPE    Mixed hyperlipidemia  -     rosuvastatin (CRESTOR) 10 MG tablet; Take 1 tablet (10 mg total) by mouth every evening.  Dispense: 90 tablet; Refill: 1    Age-related osteoporosis without current pathological fracture       Health Maintenance Topics with due status: Not Due       Topic Last Completion Date    Lipid Panel 01/19/2022    Influenza Vaccine Not Due    DEXA Scan Not Due           No future appointments.     Follow up in about 6 months (around 1/14/2023).     Signature:  Sammi Butt DNP, FNP  [address] [place] [address]  Phone #: [phone]  Fax #: [phone]    Date of encounter: 7/14/22    Patient Instructions   Continue current medication regimen. Will call pt with lab results. Recommend monthly breast exams. Recommend exercise 30 minutes per day most days of the week. Follow up in 6 months for chronic medical problems. Follow up as needed.

## 2022-07-16 LAB
BILIRUB SERPL-MCNC: NEGATIVE MG/DL
BLOOD URINE, POC: NEGATIVE
COLOR, POC UA: YELLOW
GLUCOSE UR QL STRIP: NEGATIVE
KETONES UR QL STRIP: NEGATIVE
LEUKOCYTE ESTERASE URINE, POC: NEGATIVE
NITRITE, POC UA: NEGATIVE
PH, POC UA: 6.5
PROTEIN, POC: NEGATIVE
SPECIFIC GRAVITY, POC UA: 1.01
UROBILINOGEN, POC UA: 0.2

## 2022-10-09 DIAGNOSIS — Z71.89 COMPLEX CARE COORDINATION: ICD-10-CM

## 2022-10-25 LAB
LEFT EYE DM RETINOPATHY: NEGATIVE
LEFT EYE DM RETINOPATHY: NORMAL
RIGHT EYE DM RETINOPATHY: NEGATIVE
RIGHT EYE DM RETINOPATHY: NORMAL

## 2023-01-27 ENCOUNTER — OFFICE VISIT (OUTPATIENT)
Dept: FAMILY MEDICINE | Facility: CLINIC | Age: 73
End: 2023-01-27
Payer: MEDICARE

## 2023-01-27 VITALS
WEIGHT: 162.38 LBS | RESPIRATION RATE: 18 BRPM | SYSTOLIC BLOOD PRESSURE: 130 MMHG | TEMPERATURE: 98 F | BODY MASS INDEX: 28.77 KG/M2 | DIASTOLIC BLOOD PRESSURE: 72 MMHG | OXYGEN SATURATION: 98 % | HEIGHT: 63 IN | HEART RATE: 77 BPM

## 2023-01-27 DIAGNOSIS — M81.0 AGE-RELATED OSTEOPOROSIS WITHOUT CURRENT PATHOLOGICAL FRACTURE: Chronic | ICD-10-CM

## 2023-01-27 DIAGNOSIS — I10 PRIMARY HYPERTENSION: ICD-10-CM

## 2023-01-27 DIAGNOSIS — Z00.00 ENCOUNTER FOR SUBSEQUENT ANNUAL WELLNESS VISIT (AWV) IN MEDICARE PATIENT: Primary | ICD-10-CM

## 2023-01-27 DIAGNOSIS — I10 ESSENTIAL HYPERTENSION: ICD-10-CM

## 2023-01-27 DIAGNOSIS — M10.071 ACUTE IDIOPATHIC GOUT INVOLVING TOE OF RIGHT FOOT: ICD-10-CM

## 2023-01-27 DIAGNOSIS — E78.2 MIXED HYPERLIPIDEMIA: ICD-10-CM

## 2023-01-27 DIAGNOSIS — M25.571 ARTHRALGIA OF RIGHT FOOT: ICD-10-CM

## 2023-01-27 LAB
ALBUMIN SERPL BCP-MCNC: 4.3 G/DL (ref 3.5–5)
ALBUMIN/GLOB SERPL: 1.2 {RATIO}
ALP SERPL-CCNC: 63 U/L (ref 55–142)
ALT SERPL W P-5'-P-CCNC: 24 U/L (ref 13–56)
ANION GAP SERPL CALCULATED.3IONS-SCNC: 10 MMOL/L (ref 7–16)
AST SERPL W P-5'-P-CCNC: 19 U/L (ref 15–37)
BASOPHILS # BLD AUTO: 0.09 K/UL (ref 0–0.2)
BASOPHILS NFR BLD AUTO: 1 % (ref 0–1)
BILIRUB SERPL-MCNC: 0.5 MG/DL (ref ?–1.2)
BUN SERPL-MCNC: 19 MG/DL (ref 7–18)
BUN/CREAT SERPL: 20 (ref 6–20)
CALCIUM SERPL-MCNC: 9.9 MG/DL (ref 8.5–10.1)
CHLORIDE SERPL-SCNC: 102 MMOL/L (ref 98–107)
CHOLEST SERPL-MCNC: 135 MG/DL (ref 0–200)
CHOLEST/HDLC SERPL: 2.1 {RATIO}
CO2 SERPL-SCNC: 34 MMOL/L (ref 21–32)
CREAT SERPL-MCNC: 0.95 MG/DL (ref 0.55–1.02)
CREAT UR-MCNC: 61 MG/DL (ref 28–219)
DIFFERENTIAL METHOD BLD: ABNORMAL
EGFR (NO RACE VARIABLE) (RUSH/TITUS): 63 ML/MIN/1.73M²
EOSINOPHIL # BLD AUTO: 0.17 K/UL (ref 0–0.5)
EOSINOPHIL NFR BLD AUTO: 1.8 % (ref 1–4)
ERYTHROCYTE [DISTWIDTH] IN BLOOD BY AUTOMATED COUNT: 12.6 % (ref 11.5–14.5)
GLOBULIN SER-MCNC: 3.6 G/DL (ref 2–4)
GLUCOSE SERPL-MCNC: 82 MG/DL (ref 74–106)
HCT VFR BLD AUTO: 46.3 % (ref 38–47)
HDLC SERPL-MCNC: 64 MG/DL (ref 40–60)
HGB BLD-MCNC: 14.9 G/DL (ref 12–16)
IMM GRANULOCYTES # BLD AUTO: 0.03 K/UL (ref 0–0.04)
IMM GRANULOCYTES NFR BLD: 0.3 % (ref 0–0.4)
LDLC SERPL CALC-MCNC: 40 MG/DL
LYMPHOCYTES # BLD AUTO: 2.99 K/UL (ref 1–4.8)
LYMPHOCYTES NFR BLD AUTO: 32.4 % (ref 27–41)
MCH RBC QN AUTO: 28.7 PG (ref 27–31)
MCHC RBC AUTO-ENTMCNC: 32.2 G/DL (ref 32–36)
MCV RBC AUTO: 89.2 FL (ref 80–96)
MICROALBUMIN UR-MCNC: 1.1 MG/DL (ref 0–2.8)
MICROALBUMIN/CREAT RATIO PNL UR: 18 MG/G (ref 0–30)
MONOCYTES # BLD AUTO: 0.85 K/UL (ref 0–0.8)
MONOCYTES NFR BLD AUTO: 9.2 % (ref 2–6)
MPC BLD CALC-MCNC: 12 FL (ref 9.4–12.4)
NEUTROPHILS # BLD AUTO: 5.09 K/UL (ref 1.8–7.7)
NEUTROPHILS NFR BLD AUTO: 55.3 % (ref 53–65)
NONHDLC SERPL-MCNC: 71 MG/DL
NRBC # BLD AUTO: 0 X10E3/UL
NRBC, AUTO (.00): 0 %
PLATELET # BLD AUTO: 234 K/UL (ref 150–400)
POTASSIUM SERPL-SCNC: 3.7 MMOL/L (ref 3.5–5.1)
PROT SERPL-MCNC: 7.9 G/DL (ref 6.4–8.2)
RBC # BLD AUTO: 5.19 M/UL (ref 4.2–5.4)
SODIUM SERPL-SCNC: 142 MMOL/L (ref 136–145)
TRIGL SERPL-MCNC: 157 MG/DL (ref 35–150)
URATE SERPL-MCNC: 7.5 MG/DL (ref 2.6–6)
VLDLC SERPL-MCNC: 31 MG/DL
WBC # BLD AUTO: 9.22 K/UL (ref 4.5–11)

## 2023-01-27 PROCEDURE — G0439 PPPS, SUBSEQ VISIT: HCPCS | Mod: ,,, | Performed by: NURSE PRACTITIONER

## 2023-01-27 PROCEDURE — 82043 MICROALBUMIN / CREATININE RATIO URINE: ICD-10-PCS | Mod: ,,, | Performed by: CLINICAL MEDICAL LABORATORY

## 2023-01-27 PROCEDURE — 80061 LIPID PANEL: ICD-10-PCS | Mod: ,,, | Performed by: CLINICAL MEDICAL LABORATORY

## 2023-01-27 PROCEDURE — 84550 ASSAY OF BLOOD/URIC ACID: CPT | Mod: ,,, | Performed by: CLINICAL MEDICAL LABORATORY

## 2023-01-27 PROCEDURE — 80061 LIPID PANEL: CPT | Mod: ,,, | Performed by: CLINICAL MEDICAL LABORATORY

## 2023-01-27 PROCEDURE — G0439 PR MEDICARE ANNUAL WELLNESS SUBSEQUENT VISIT: ICD-10-PCS | Mod: ,,, | Performed by: NURSE PRACTITIONER

## 2023-01-27 PROCEDURE — 82570 MICROALBUMIN / CREATININE RATIO URINE: ICD-10-PCS | Mod: ,,, | Performed by: CLINICAL MEDICAL LABORATORY

## 2023-01-27 PROCEDURE — 85025 CBC WITH DIFFERENTIAL: ICD-10-PCS | Mod: ,,, | Performed by: CLINICAL MEDICAL LABORATORY

## 2023-01-27 PROCEDURE — 84550 URIC ACID: ICD-10-PCS | Mod: ,,, | Performed by: CLINICAL MEDICAL LABORATORY

## 2023-01-27 PROCEDURE — 80053 COMPREHENSIVE METABOLIC PANEL: ICD-10-PCS | Mod: ,,, | Performed by: CLINICAL MEDICAL LABORATORY

## 2023-01-27 PROCEDURE — 82043 UR ALBUMIN QUANTITATIVE: CPT | Mod: ,,, | Performed by: CLINICAL MEDICAL LABORATORY

## 2023-01-27 PROCEDURE — 85025 COMPLETE CBC W/AUTO DIFF WBC: CPT | Mod: ,,, | Performed by: CLINICAL MEDICAL LABORATORY

## 2023-01-27 PROCEDURE — 82570 ASSAY OF URINE CREATININE: CPT | Mod: ,,, | Performed by: CLINICAL MEDICAL LABORATORY

## 2023-01-27 PROCEDURE — 80053 COMPREHEN METABOLIC PANEL: CPT | Mod: ,,, | Performed by: CLINICAL MEDICAL LABORATORY

## 2023-01-27 RX ORDER — ROSUVASTATIN CALCIUM 10 MG/1
10 TABLET, COATED ORAL NIGHTLY
Qty: 90 TABLET | Refills: 1 | Status: SHIPPED | OUTPATIENT
Start: 2023-01-27 | End: 2023-10-23 | Stop reason: SDUPTHER

## 2023-01-27 RX ORDER — CHLORTHALIDONE 25 MG/1
25 TABLET ORAL EVERY MORNING
Qty: 90 TABLET | Refills: 1 | Status: SHIPPED | OUTPATIENT
Start: 2023-01-27 | End: 2023-10-20

## 2023-01-27 NOTE — PATIENT INSTRUCTIONS
Counseling and Referral of Other Preventative  (Italic type indicates deductible and co-insurance are waived)    Patient Name: Dayana Bae  Today's Date: 1/27/2023    Health Maintenance         Date Due Completion Date    DEXA Scan 01/01/2023 10/19/2020    Hepatitis C Screening 01/27/2023 (Originally 1950) ---    Mammogram 01/28/2023 (Originally 1/27/2023) 1/27/2022    Pneumococcal Vaccines (Age 65+) (1 - PCV) 02/02/2023 (Originally 6/7/2013) ---    TETANUS VACCINE 03/16/2023 (Originally 6/7/1966) ---    Shingles Vaccine (1 of 2) 03/16/2023 (Originally 6/7/1998) ---    Lipid Panel 07/14/2023 7/14/2022          Orders Placed This Encounter   Procedures    DXA Bone Density Spine And Hip

## 2023-01-27 NOTE — PROGRESS NOTES
CONLEY BERNICE   [facility]      PATIENT NAME: Dayana Bae   : 1950    AGE: 72 y.o. DATE: 2023   MRN: [id number]        Reason for Visit / Chief Complaint: Medicare AWV (Subsequent Medicare AWV )        Dayana Bae presents for a Subsequent Medicare AWV today.     The following components were reviewed and updated:    Medical/Social/Family History:  Past Medical History:   Diagnosis Date    Cataract     Hard of hearing     Hyperlipidemia     Hypertension     Osteoporosis     Sleep apnea, unspecified         Family History   Problem Relation Age of Onset    Heart disease Mother     Heart disease Father     Hypertension Sister     Hypertension Brother         Social History     Tobacco Use   Smoking Status Never   Smokeless Tobacco Never      Social History     Substance and Sexual Activity   Alcohol Use Never       Family History   Problem Relation Age of Onset    Heart disease Mother     Heart disease Father     Hypertension Sister     Hypertension Brother        Past Surgical History:   Procedure Laterality Date    CARPAL TUNNEL RELEASE      TUBAL LIGATION           Allergies and Current Medications   Review of patient's allergies indicates:  No Known Allergies    Current Outpatient Medications:     chlorthalidone (HYGROTEN) 25 MG Tab, Take 1 tablet (25 mg total) by mouth every morning., Disp: 90 tablet, Rfl: 1    colchicine (COLCRYS) 0.6 mg tablet, Take 2 tablets stat and repeat 1 tablet in 1 hour. Repeat in 1 day if symptoms persist., Disp: 6 tablet, Rfl: 2    rosuvastatin (CRESTOR) 10 MG tablet, Take 1 tablet (10 mg total) by mouth every evening., Disp: 90 tablet, Rfl: 1    Health Risk Assessment   Fall Risk: No   Advance Directive:  requested to bring copy to clinic   Depression: PHQ9 score: 0    HTN: DASH diet, exercise, weight management, med compliance, home BP monitoring, and follow-up discussed.   T2DM: No   Tobacco use: No  STI: Not at risk     Alcohol misuse: No   Statin Use: Yes    Opioid Risk Score         Value Time User    Opioid Risk Score  0 1/27/2023  3:07 PM Olga Hendrix RN               Health Risk Assessment  What [age] 70-79  Are you male or female?: Female  During the past four weeks, how much have you been bothered by emotional problems such as feeling anxious, depressed, irritable, sad, or downhearted and blue?: Not at all  During the past five weeks, has your physical and/or emotional health limited your social activities with family, friends, neighbors, or groups?: Not at all  During the past four weeks, how much bodily pain have you generally had?: Very mild pain  During the past four weeks, was someone available to help if you needed and wanted help?: Yes, as much as I wanted  During the past four weeks, what was the hardest physical activity you could do for at least two minutes?: Moderate  Can you get to places out of walking distance without help?  (For example, can you travel alone on buses or taxis, or drive your own car?): Yes  Can you go shopping for groceries or clothes without someone's help?: Yes  Can you prepare your own meals?: Yes  Can you do your own housework without help?: Yes  Because of any health problems, do you need the help of another person with your personal care needs such as eating, bathing, dressing, or getting around the house?: No  Can you handle your own money without help?: Yes  During the past four weeks, how would you rate your health in general?: Good  How have things been going for you during the past four weeks?: Very well  Are you having difficulties driving your car?: No  Do you always fasten your seat belt when you are in a car?: Yes, usually  How often in the past four weeks have you been bothered by falling or dizzy when standing up?: Sometimes  How often in the past four weeks have you been bothered by sexual problems?: Never  How often in the past four weeks have you been  bothered by trouble eating well?: Never  How often in the past four weeks have you been bothered by teeth or denture problems?: Never  How often in the past four weeks have you been bothered with problems using the telephone?: Never  How often in the past four weeks have you been bothered by tiredness or fatigue?: Seldom  Have you fallen two or more times in the past year?: No  Are you afraid of falling?: No  Are you a smoker?: No  During the past four weeks, how many drinks of wine, beer, or other alcoholic beverages did you have?: No alcohol at all  Do you exercise for about 20 minutes three or more days a week?: No, I usually do not exercise this much  Have you been given any information to help you with hazards in your house that might hurt you?: Yes  Have you been given any information to help you with keeping track of your medications?: Yes  How often do you have trouble taking medicines the way you've been told to take them?: I always take them as prescribed  How confident are you that you can control and manage most of your health problems?: Very confident  What is your race? (Check all that apply.):     Health Maintenance       Health Maintenance Topics with due status: Not Due       Topic Last Completion Date    TETANUS VACCINE 09/01/2016    Lipid Panel 01/27/2023     Health Maintenance Due   Topic Date Due    Hepatitis C Screening  Never done    Mammogram  01/27/2023    DEXA Scan  01/01/2023       Incontinence  Bowel: No  Bladder: No    Lab results available in Epic or see dates from [facility] above:   Lab Results   Component Value Date    CHOL 135 01/27/2023    CHOL 158 07/14/2022    CHOL 124 01/19/2022     Lab Results   Component Value Date    HDL 64 (H) 01/27/2023    HDL 61 (H) 07/14/2022    HDL 40 01/19/2022     Lab Results   Component Value Date    LDLCALC 40 01/27/2023    LDLCALC 52 07/14/2022    LDLCALC 42 01/19/2022     Lab Results   Component Value Date    TRIG 157 (H) 01/27/2023    TRIG 225  (H) 07/14/2022    TRIG 210 (H) 01/19/2022     Lab Results   Component Value Date    CHOLHDL 2.1 01/27/2023    CHOLHDL 2.6 07/14/2022    CHOLHDL 3.1 01/19/2022       Lab Results   Component Value Date    HGBA1C 5.6 02/08/2022       Sodium   Date Value Ref Range Status   01/27/2023 142 136 - 145 mmol/L Final     Potassium   Date Value Ref Range Status   01/27/2023 3.7 3.5 - 5.1 mmol/L Final     Chloride   Date Value Ref Range Status   01/27/2023 102 98 - 107 mmol/L Final     CO2   Date Value Ref Range Status   01/27/2023 34 (H) 21 - 32 mmol/L Final     Glucose   Date Value Ref Range Status   01/27/2023 82 74 - 106 mg/dL Final     BUN   Date Value Ref Range Status   01/27/2023 19 (H) 7 - 18 mg/dL Final     Creatinine   Date Value Ref Range Status   01/27/2023 0.95 0.55 - 1.02 mg/dL Final     Calcium   Date Value Ref Range Status   01/27/2023 9.9 8.5 - 10.1 mg/dL Final     Total Protein   Date Value Ref Range Status   01/27/2023 7.9 6.4 - 8.2 g/dL Final     Albumin   Date Value Ref Range Status   01/27/2023 4.3 3.5 - 5.0 g/dL Final     Bilirubin, Total   Date Value Ref Range Status   01/27/2023 0.5 >0.0 - 1.2 mg/dL Final     Alk Phos   Date Value Ref Range Status   01/27/2023 63 55 - 142 U/L Final     AST   Date Value Ref Range Status   01/27/2023 19 15 - 37 U/L Final     ALT   Date Value Ref Range Status   01/27/2023 24 13 - 56 U/L Final     Anion Gap   Date Value Ref Range Status   01/27/2023 10 7 - 16 mmol/L Final     eGFR   Date Value Ref Range Status   07/14/2022 58 (L) >=60 mL/min/1.73m² Final         Care Team   PCP: Sammi Butt DNP, FNP    Eye specialist:        **See Completed Assessments for Annual Wellness visit within the encounter summary    The following assessments were completed & reviewed:  Depression Screening  Cognitive function Screening  Timed Get Up Test  Whisper Test  Vision Screen  Health Risk Assessment  Checklist of ADLs and IADLs      Objective  Vitals:    01/27/23 1459   BP:  "130/72   Pulse: 77   Resp: 18   Temp: 98.3 °F (36.8 °C)   TempSrc: Oral   SpO2: 98%   Weight: 73.7 kg (162 lb 6.4 oz)   Height: 5' 3" (1.6 m)   PainSc: 0-No pain      Body mass index is 28.77 kg/m².  Ideal body weight: 52.4 kg (115 lb 8.3 oz)       Physical Exam  Vitals and nursing note reviewed.   Constitutional:       General: She is not in acute distress.  HENT:      Nose: Nose normal.      Mouth/Throat:      Mouth: Mucous membranes are moist.   Eyes:      Pupils: Pupils are equal, round, and reactive to light.   Cardiovascular:      Rate and Rhythm: Normal rate and regular rhythm.      Pulses: Normal pulses.      Heart sounds: Normal heart sounds. No murmur heard.  Pulmonary:      Effort: Pulmonary effort is normal. No respiratory distress.      Breath sounds: Normal breath sounds. No wheezing, rhonchi or rales.   Chest:      Chest wall: No tenderness.   Abdominal:      General: Bowel sounds are normal.      Palpations: Abdomen is soft.   Musculoskeletal:         General: Normal range of motion.      Cervical back: Normal range of motion and neck supple.      Right lower leg: No edema.      Left lower leg: No edema.      Right foot: Prominent metatarsal heads present.        Feet:    Feet:      Comments: Pain to right great MTP joint with redness and TTP  Skin:     General: Skin is warm and dry.   Neurological:      General: No focal deficit present.      Mental Status: She is alert and oriented to person, place, and time.         Assessment:     1. Encounter for subsequent annual wellness visit (AWV) in Medicare patient    2. BMI 28.0-28.9,adult    3. Essential hypertension  - Comprehensive Metabolic Panel; Future  - CBC Auto Differential; Future  - Lipid Panel; Future  - Microalbumin/Creatinine Ratio, Urine  - Comprehensive Metabolic Panel  - CBC Auto Differential  - Lipid Panel    4. Age-related osteoporosis without current pathological fracture  - DXA Bone Density Spine And Hip; Future  - DXA Bone Density " Spine And Hip    5. Primary hypertension  - chlorthalidone (HYGROTEN) 25 MG Tab; Take 1 tablet (25 mg total) by mouth every morning.  Dispense: 90 tablet; Refill: 1    6. Mixed hyperlipidemia  - rosuvastatin (CRESTOR) 10 MG tablet; Take 1 tablet (10 mg total) by mouth every evening.  Dispense: 90 tablet; Refill: 1    7. Arthralgia of right foot  - Uric Acid; Future  - Uric Acid    8. Acute idiopathic gout involving toe of right foot         Plan:        Discussed and provided with a screening schedule and personal prevention plan in accordance with USPSTF age appropriate recommendations and Medicare screening guidelines.   Education, counseling, and referrals were provided as needed.  After Visit Summary printed and given to patient which includes written education and a list of any referrals if indicated.     Education including advanced directives, diet, exercise, falls, and preventive health discussed with patient and patient verbalized understanding.      F/u plan for yearly AWV.    Signature:  Sammi ANN

## 2023-01-29 DIAGNOSIS — M10.071 ACUTE IDIOPATHIC GOUT OF RIGHT FOOT: Primary | ICD-10-CM

## 2023-01-29 RX ORDER — COLCHICINE 0.6 MG/1
TABLET ORAL
Qty: 6 TABLET | Refills: 2 | Status: SHIPPED | OUTPATIENT
Start: 2023-01-29 | End: 2023-10-23

## 2023-02-06 ENCOUNTER — OUTSIDE PLACE OF SERVICE (OUTPATIENT)
Dept: CARDIOLOGY | Facility: HOSPITAL | Age: 73
End: 2023-02-06
Payer: MEDICARE

## 2023-02-06 PROCEDURE — 93010 ELECTROCARDIOGRAM REPORT: CPT | Mod: ,,, | Performed by: INTERNAL MEDICINE

## 2023-02-06 PROCEDURE — 93010 PR ELECTROCARDIOGRAM REPORT: ICD-10-PCS | Mod: ,,, | Performed by: INTERNAL MEDICINE

## 2023-02-07 DIAGNOSIS — Z12.31 ENCOUNTER FOR SCREENING MAMMOGRAM FOR MALIGNANT NEOPLASM OF BREAST: Primary | ICD-10-CM

## 2023-02-15 LAB
BCS RECOMMENDATION EXT: NORMAL
BMD RECOMMENDATION EXT: NORMAL
BMD RECOMMENDATION EXT: NORMAL

## 2023-05-09 DIAGNOSIS — Z71.89 COMPLEX CARE COORDINATION: ICD-10-CM

## 2023-08-22 ENCOUNTER — OFFICE VISIT (OUTPATIENT)
Dept: FAMILY MEDICINE | Facility: CLINIC | Age: 73
End: 2023-08-22
Payer: MEDICARE

## 2023-08-22 VITALS
BODY MASS INDEX: 28.35 KG/M2 | WEIGHT: 160 LBS | OXYGEN SATURATION: 96 % | TEMPERATURE: 98 F | RESPIRATION RATE: 20 BRPM | HEIGHT: 63 IN | DIASTOLIC BLOOD PRESSURE: 82 MMHG | HEART RATE: 76 BPM | SYSTOLIC BLOOD PRESSURE: 144 MMHG

## 2023-08-22 DIAGNOSIS — R09.81 NASAL CONGESTION: ICD-10-CM

## 2023-08-22 DIAGNOSIS — Z20.822 COVID-19 VIRUS TEST RESULT UNKNOWN: ICD-10-CM

## 2023-08-22 DIAGNOSIS — U07.1 COVID-19: Primary | ICD-10-CM

## 2023-08-22 DIAGNOSIS — R05.9 COUGH, UNSPECIFIED TYPE: ICD-10-CM

## 2023-08-22 LAB
CTP QC/QA: YES
FLUAV AG NPH QL: NEGATIVE
FLUBV AG NPH QL: NEGATIVE
SARS-COV-2 AG RESP QL IA.RAPID: POSITIVE

## 2023-08-22 PROCEDURE — 99214 PR OFFICE/OUTPT VISIT, EST, LEVL IV, 30-39 MIN: ICD-10-PCS | Mod: ,,, | Performed by: NURSE PRACTITIONER

## 2023-08-22 PROCEDURE — 99214 OFFICE O/P EST MOD 30 MIN: CPT | Mod: ,,, | Performed by: NURSE PRACTITIONER

## 2023-08-22 PROCEDURE — 87428 SARSCOV & INF VIR A&B AG IA: CPT | Mod: RHCUB | Performed by: NURSE PRACTITIONER

## 2023-08-22 RX ORDER — FLUTICASONE PROPIONATE 50 MCG
2 SPRAY, SUSPENSION (ML) NASAL DAILY
Qty: 16 G | Refills: 0 | Status: SHIPPED | OUTPATIENT
Start: 2023-08-22 | End: 2023-10-23

## 2023-08-22 NOTE — PROGRESS NOTES
Sammi Butt DNP, FNP    [address] [place] [address]     PATIENT NAME: Dayana Bae  : 1950  DATE: 23  MRN: [id number]      Billing Provider: Sammi Butt DNP, FNP  Level of Service:   Patient PCP Information       Provider PCP Type    Sammi Butt DNP, FNP General            Reason for Visit / Chief Complaint: Nasal Congestion, Sinusitis, and Cough (Started over the weekend. )       Update PCP  Update Chief Complaint         History of Present Illness / Problem Focused Workflow     Dayana Bae presents to the clinic with Nasal Congestion, Sinusitis, and Cough (Started over the weekend. )     Sinusitis  Associated symptoms include congestion, coughing and headaches. Pertinent negatives include no chills, ear pain, shortness of breath or sore throat.   Cough  Associated symptoms include headaches. Pertinent negatives include no chest pain, chills, ear pain, fever, myalgias, postnasal drip, rash, sore throat, shortness of breath or wheezing.       Review of Systems     Review of Systems   Constitutional:  Negative for activity change, appetite change, chills, fatigue and fever.   HENT:  Positive for nasal congestion. Negative for ear pain, hearing loss, postnasal drip and sore throat.    Respiratory:  Positive for cough. Negative for chest tightness, shortness of breath and wheezing.    Cardiovascular:  Negative for chest pain, palpitations, leg swelling and claudication.   Gastrointestinal:  Negative for abdominal pain, change in bowel habit, constipation, diarrhea, nausea, vomiting and change in bowel habit.   Genitourinary:  Negative for dysuria.   Musculoskeletal:  Negative for arthralgias, back pain, gait problem and myalgias.   Integumentary:  Negative for rash.   Neurological:  Positive for headaches. Negative for weakness.   Psychiatric/Behavioral:  Negative for suicidal ideas. The patient is not nervous/anxious.         Medical  "/ Social / Family History     Past Medical History:   Diagnosis Date    Cataract     Hard of hearing     Hyperlipidemia     Hypertension     Osteoporosis     Sleep apnea, unspecified        Past Surgical History:   Procedure Laterality Date    CARPAL TUNNEL RELEASE      TUBAL LIGATION         Social History  Ms. Dayana Bae  reports that she has never smoked. She has never been exposed to tobacco smoke. She has never used smokeless tobacco. She reports that she does not drink alcohol and does not use drugs.    Family History  Ms. Dayana Bae's family history includes Heart disease in her father and mother; Hypertension in her brother and sister.    Medications and Allergies     Medications  Outpatient Medications Marked as Taking for the 8/22/23 encounter (Office Visit) with Sammi Butt, DERICK, FNP   Medication Sig Dispense Refill    chlorthalidone (HYGROTEN) 25 MG Tab Take 1 tablet (25 mg total) by mouth every morning. 90 tablet 1    rosuvastatin (CRESTOR) 10 MG tablet Take 1 tablet (10 mg total) by mouth every evening. 90 tablet 1       Allergies  Review of patient's allergies indicates:  No Known Allergies    Physical Examination     Vitals:    08/22/23 1529 08/22/23 1532   BP: (!) 143/81 (!) 144/82   BP Location: Left arm Right arm   Patient Position: Sitting Sitting   BP Method: Large (Automatic) Large (Automatic)   Pulse: 76    Resp: 20    Temp: 98.3 °F (36.8 °C)    TempSrc: Oral    SpO2: 96%    Weight: 72.6 kg (160 lb)    Height: 5' 3" (1.6 m)      Physical Exam  Vitals and nursing note reviewed.   Constitutional:       General: She is not in acute distress.     Appearance: Normal appearance. She is not ill-appearing.   Eyes:      Extraocular Movements: Extraocular movements intact.      Pupils: Pupils are equal, round, and reactive to light.   Cardiovascular:      Rate and Rhythm: Normal rate and regular rhythm.      Heart sounds: Normal heart sounds.   Pulmonary:      Effort: Pulmonary " effort is normal.      Breath sounds: Normal breath sounds.   Abdominal:      General: Bowel sounds are normal.      Palpations: Abdomen is soft.   Musculoskeletal:         General: Normal range of motion.   Skin:     Findings: No rash.   Neurological:      General: No focal deficit present.      Mental Status: She is alert and oriented to person, place, and time. Mental status is at baseline.   Psychiatric:         Mood and Affect: Mood normal.         Behavior: Behavior normal.          Assessment and Plan (including Health Maintenance)      Problem List  Smart Sets  Document Outside HM   :    Plan:         Health Maintenance Due   Topic Date Due    Hepatitis C Screening  Never done    Shingles Vaccine (1 of 2) Never done       Problem List Items Addressed This Visit    None  Visit Diagnoses       COVID-19    -  Primary    Relevant Medications    chlorpheniramine-phenylephrine 4-10 mg per tablet    fluticasone propionate (FLONASE) 50 mcg/actuation nasal spray    Nasal congestion        Relevant Medications    chlorpheniramine-phenylephrine 4-10 mg per tablet    fluticasone propionate (FLONASE) 50 mcg/actuation nasal spray    Other Relevant Orders    POCT SARS-COV2 (COVID) with Flu Antigen (Completed)    Cough, unspecified type        Relevant Medications    chlorpheniramine-phenylephrine 4-10 mg per tablet    Other Relevant Orders    POCT SARS-COV2 (COVID) with Flu Antigen (Completed)    COVID-19 virus test result unknown        Relevant Orders    POCT SARS-COV2 (COVID) with Flu Antigen (Completed)          COVID-19  -     chlorpheniramine-phenylephrine 4-10 mg per tablet; Take 1 tablet by mouth 3 (three) times daily as needed for Congestion.  Dispense: 30 tablet; Refill: 0  -     fluticasone propionate (FLONASE) 50 mcg/actuation nasal spray; 2 sprays (100 mcg total) by Each Nostril route once daily.  Dispense: 16 g; Refill: 0    Nasal congestion  -     POCT SARS-COV2 (COVID) with Flu Antigen  -      chlorpheniramine-phenylephrine 4-10 mg per tablet; Take 1 tablet by mouth 3 (three) times daily as needed for Congestion.  Dispense: 30 tablet; Refill: 0  -     fluticasone propionate (FLONASE) 50 mcg/actuation nasal spray; 2 sprays (100 mcg total) by Each Nostril route once daily.  Dispense: 16 g; Refill: 0    Cough, unspecified type  -     POCT SARS-COV2 (COVID) with Flu Antigen  -     chlorpheniramine-phenylephrine 4-10 mg per tablet; Take 1 tablet by mouth 3 (three) times daily as needed for Congestion.  Dispense: 30 tablet; Refill: 0    COVID-19 virus test result unknown  -     POCT SARS-COV2 (COVID) with Flu Antigen       Health Maintenance Topics with due status: Not Due       Topic Last Completion Date    TETANUS VACCINE 09/01/2016    Influenza Vaccine 01/27/2023    Lipid Panel 01/27/2023    DEXA Scan 02/15/2023           Future Appointments   Date Time Provider Department Center   1/26/2024  3:00 PM AWV NURSE, [facility] VIVIEN Lama        Follow up if symptoms worsen or fail to improve.     Signature:  Sammi Butt DNP, FNP  [address] [place] [address]  Phone #: [phone]  Fax #: [phone]    Date of encounter: 8/22/23    Patient Instructions   Quarantine for 5 days then mask for 5 days. Increase fluid intake and rest! Seek medical attention if symptoms persist or worsen.

## 2023-09-20 ENCOUNTER — PATIENT MESSAGE (OUTPATIENT)
Dept: ADMINISTRATIVE | Facility: HOSPITAL | Age: 73
End: 2023-09-20

## 2023-10-19 DIAGNOSIS — I10 PRIMARY HYPERTENSION: ICD-10-CM

## 2023-10-20 RX ORDER — CHLORTHALIDONE 25 MG/1
TABLET ORAL
Qty: 30 TABLET | Refills: 0 | Status: SHIPPED | OUTPATIENT
Start: 2023-10-20 | End: 2023-10-23

## 2023-10-23 ENCOUNTER — OFFICE VISIT (OUTPATIENT)
Dept: FAMILY MEDICINE | Facility: CLINIC | Age: 73
End: 2023-10-23
Payer: MEDICARE

## 2023-10-23 VITALS
WEIGHT: 159.81 LBS | DIASTOLIC BLOOD PRESSURE: 84 MMHG | OXYGEN SATURATION: 96 % | SYSTOLIC BLOOD PRESSURE: 149 MMHG | RESPIRATION RATE: 18 BRPM | HEIGHT: 63 IN | TEMPERATURE: 98 F | HEART RATE: 66 BPM | BODY MASS INDEX: 28.32 KG/M2

## 2023-10-23 DIAGNOSIS — M81.0 AGE-RELATED OSTEOPOROSIS WITHOUT CURRENT PATHOLOGICAL FRACTURE: ICD-10-CM

## 2023-10-23 DIAGNOSIS — E78.2 MIXED HYPERLIPIDEMIA: ICD-10-CM

## 2023-10-23 DIAGNOSIS — I10 ESSENTIAL HYPERTENSION: Primary | ICD-10-CM

## 2023-10-23 DIAGNOSIS — I10 PRIMARY HYPERTENSION: ICD-10-CM

## 2023-10-23 LAB
25(OH)D3 SERPL-MCNC: 28 NG/ML
ALBUMIN SERPL BCP-MCNC: 4 G/DL (ref 3.5–5)
ALBUMIN/GLOB SERPL: 1.1 {RATIO}
ALP SERPL-CCNC: 86 U/L (ref 55–142)
ALT SERPL W P-5'-P-CCNC: 32 U/L (ref 13–56)
ANION GAP SERPL CALCULATED.3IONS-SCNC: 7 MMOL/L (ref 7–16)
AST SERPL W P-5'-P-CCNC: 26 U/L (ref 15–37)
BASOPHILS # BLD AUTO: 0.09 K/UL (ref 0–0.2)
BASOPHILS NFR BLD AUTO: 1.3 % (ref 0–1)
BILIRUB SERPL-MCNC: 0.5 MG/DL (ref ?–1.2)
BILIRUB SERPL-MCNC: NORMAL MG/DL
BLOOD URINE, POC: NORMAL
BUN SERPL-MCNC: 14 MG/DL (ref 7–18)
BUN/CREAT SERPL: 14 (ref 6–20)
CALCIUM SERPL-MCNC: 9.3 MG/DL (ref 8.5–10.1)
CHLORIDE SERPL-SCNC: 102 MMOL/L (ref 98–107)
CHOLEST SERPL-MCNC: 143 MG/DL (ref 0–200)
CHOLEST/HDLC SERPL: 2.5 {RATIO}
CO2 SERPL-SCNC: 34 MMOL/L (ref 21–32)
COLOR, POC UA: YELLOW
CREAT SERPL-MCNC: 0.99 MG/DL (ref 0.55–1.02)
CREAT UR-MCNC: 46 MG/DL (ref 28–219)
DIFFERENTIAL METHOD BLD: ABNORMAL
EGFR (NO RACE VARIABLE) (RUSH/TITUS): 60 ML/MIN/1.73M2
EOSINOPHIL # BLD AUTO: 0.25 K/UL (ref 0–0.5)
EOSINOPHIL NFR BLD AUTO: 3.5 % (ref 1–4)
ERYTHROCYTE [DISTWIDTH] IN BLOOD BY AUTOMATED COUNT: 13.2 % (ref 11.5–14.5)
GLOBULIN SER-MCNC: 3.8 G/DL (ref 2–4)
GLUCOSE SERPL-MCNC: 111 MG/DL (ref 74–106)
GLUCOSE UR QL STRIP: NORMAL
HCT VFR BLD AUTO: 45.2 % (ref 38–47)
HDLC SERPL-MCNC: 57 MG/DL (ref 40–60)
HGB BLD-MCNC: 14.8 G/DL (ref 12–16)
IMM GRANULOCYTES # BLD AUTO: 0.01 K/UL (ref 0–0.04)
IMM GRANULOCYTES NFR BLD: 0.1 % (ref 0–0.4)
KETONES UR QL STRIP: NORMAL
LDLC SERPL CALC-MCNC: 54 MG/DL
LEUKOCYTE ESTERASE URINE, POC: NORMAL
LYMPHOCYTES # BLD AUTO: 2.77 K/UL (ref 1–4.8)
LYMPHOCYTES NFR BLD AUTO: 39.1 % (ref 27–41)
MCH RBC QN AUTO: 28.9 PG (ref 27–31)
MCHC RBC AUTO-ENTMCNC: 32.7 G/DL (ref 32–36)
MCV RBC AUTO: 88.3 FL (ref 80–96)
MICROALBUMIN UR-MCNC: 0.8 MG/DL (ref 0–2.8)
MICROALBUMIN/CREAT RATIO PNL UR: 17.4 MG/G (ref 0–30)
MONOCYTES # BLD AUTO: 0.77 K/UL (ref 0–0.8)
MONOCYTES NFR BLD AUTO: 10.9 % (ref 2–6)
MPC BLD CALC-MCNC: 12.2 FL (ref 9.4–12.4)
NEUTROPHILS # BLD AUTO: 3.2 K/UL (ref 1.8–7.7)
NEUTROPHILS NFR BLD AUTO: 45.1 % (ref 53–65)
NITRITE, POC UA: NORMAL
NONHDLC SERPL-MCNC: 86 MG/DL
NRBC # BLD AUTO: 0 X10E3/UL
NRBC, AUTO (.00): 0 %
PH, POC UA: 7
PLATELET # BLD AUTO: 226 K/UL (ref 150–400)
POTASSIUM SERPL-SCNC: 3.4 MMOL/L (ref 3.5–5.1)
PROT SERPL-MCNC: 7.8 G/DL (ref 6.4–8.2)
PROTEIN, POC: NORMAL
RBC # BLD AUTO: 5.12 M/UL (ref 4.2–5.4)
SODIUM SERPL-SCNC: 140 MMOL/L (ref 136–145)
SPECIFIC GRAVITY, POC UA: 1.01
TRIGL SERPL-MCNC: 161 MG/DL (ref 35–150)
UROBILINOGEN, POC UA: 0.2
VLDLC SERPL-MCNC: 32 MG/DL
WBC # BLD AUTO: 7.09 K/UL (ref 4.5–11)

## 2023-10-23 PROCEDURE — 82043 UR ALBUMIN QUANTITATIVE: CPT | Mod: ,,, | Performed by: CLINICAL MEDICAL LABORATORY

## 2023-10-23 PROCEDURE — 81003 URINALYSIS AUTO W/O SCOPE: CPT | Mod: RHCUB | Performed by: NURSE PRACTITIONER

## 2023-10-23 PROCEDURE — 85025 CBC WITH DIFFERENTIAL: ICD-10-PCS | Mod: ,,, | Performed by: CLINICAL MEDICAL LABORATORY

## 2023-10-23 PROCEDURE — 82570 ASSAY OF URINE CREATININE: CPT | Mod: ,,, | Performed by: CLINICAL MEDICAL LABORATORY

## 2023-10-23 PROCEDURE — 80053 COMPREHEN METABOLIC PANEL: CPT | Mod: ,,, | Performed by: CLINICAL MEDICAL LABORATORY

## 2023-10-23 PROCEDURE — 85025 COMPLETE CBC W/AUTO DIFF WBC: CPT | Mod: ,,, | Performed by: CLINICAL MEDICAL LABORATORY

## 2023-10-23 PROCEDURE — 82570 MICROALBUMIN / CREATININE RATIO URINE: ICD-10-PCS | Mod: ,,, | Performed by: CLINICAL MEDICAL LABORATORY

## 2023-10-23 PROCEDURE — 99214 PR OFFICE/OUTPT VISIT, EST, LEVL IV, 30-39 MIN: ICD-10-PCS | Mod: ,,, | Performed by: NURSE PRACTITIONER

## 2023-10-23 PROCEDURE — 99214 OFFICE O/P EST MOD 30 MIN: CPT | Mod: ,,, | Performed by: NURSE PRACTITIONER

## 2023-10-23 PROCEDURE — 80061 LIPID PANEL: ICD-10-PCS | Mod: ,,, | Performed by: CLINICAL MEDICAL LABORATORY

## 2023-10-23 PROCEDURE — 82043 MICROALBUMIN / CREATININE RATIO URINE: ICD-10-PCS | Mod: ,,, | Performed by: CLINICAL MEDICAL LABORATORY

## 2023-10-23 PROCEDURE — 80053 COMPREHENSIVE METABOLIC PANEL: ICD-10-PCS | Mod: ,,, | Performed by: CLINICAL MEDICAL LABORATORY

## 2023-10-23 PROCEDURE — 82306 VITAMIN D 25 HYDROXY: CPT | Mod: ,,, | Performed by: CLINICAL MEDICAL LABORATORY

## 2023-10-23 PROCEDURE — 82306 VITAMIN D: ICD-10-PCS | Mod: ,,, | Performed by: CLINICAL MEDICAL LABORATORY

## 2023-10-23 PROCEDURE — 80061 LIPID PANEL: CPT | Mod: ,,, | Performed by: CLINICAL MEDICAL LABORATORY

## 2023-10-23 RX ORDER — ROSUVASTATIN CALCIUM 10 MG/1
10 TABLET, COATED ORAL NIGHTLY
Qty: 90 TABLET | Refills: 1 | Status: SHIPPED | OUTPATIENT
Start: 2023-10-23

## 2023-10-23 RX ORDER — CHLORTHALIDONE 25 MG/1
37.5 TABLET ORAL DAILY
Qty: 135 TABLET | Refills: 1 | Status: SHIPPED | OUTPATIENT
Start: 2023-10-23

## 2023-10-23 NOTE — PROGRESS NOTES
Sammi Butt DNP, FNP    [address] [place] [address]     PATIENT NAME: Dayana Bae  : 1950  DATE: 10/23/23  MRN: [id number]      Billing Provider: Sammi Butt DNP, FNP  Level of Service: PA OFFICE/OUTPT VISIT, EST, LEVL IV, 30-39 MIN  Patient PCP Information       Provider PCP Type    Sammi Butt DNP, MARIO General            Reason for Visit / Chief Complaint: Medication Refill (Pt would also like her K checked - she said her K was low when she had COVID and she took a supplement and has not had it rechecked.  Pt has been out of rosuvastatin since Friday)       Update PCP  Update Chief Complaint         History of Present Illness / Problem Focused Workflow     Dayana Bae presents to the clinic with Medication Refill (Pt would also like her K checked - she said her K was low when she had COVID and she took a supplement and has not had it rechecked.  Pt has been out of rosuvastatin since Friday)     Medication Refill  Pertinent negatives include no abdominal pain, arthralgias, change in bowel habit, chest pain, chills, congestion, coughing, fatigue, fever, headaches, myalgias, nausea, rash, sore throat, vomiting or weakness.       Review of Systems     Review of Systems   Constitutional:  Negative for activity change, appetite change, chills, fatigue and fever.   HENT:  Positive for postnasal drip. Negative for nasal congestion, ear pain, hearing loss and sore throat.    Respiratory:  Negative for cough, chest tightness, shortness of breath and wheezing.    Cardiovascular:  Negative for chest pain, palpitations, leg swelling and claudication.   Gastrointestinal:  Negative for abdominal pain, change in bowel habit, constipation, diarrhea, nausea and vomiting.   Genitourinary:  Negative for dysuria.   Musculoskeletal:  Negative for arthralgias, back pain, gait problem and myalgias.   Integumentary:  Negative for rash.   Neurological:   "Negative for weakness and headaches.   Psychiatric/Behavioral:  Negative for suicidal ideas. The patient is not nervous/anxious.         Medical / Social / Family History     Past Medical History:   Diagnosis Date    Cataract     Hard of hearing     Hyperlipidemia     Hypertension     Osteoporosis     Sleep apnea, unspecified        Past Surgical History:   Procedure Laterality Date    CARPAL TUNNEL RELEASE      TUBAL LIGATION         Social History  Ms. Dayana Bae  reports that she has never smoked. She has never been exposed to tobacco smoke. She has never used smokeless tobacco. She reports that she does not drink alcohol and does not use drugs.    Family History  Ms. Dayana Bae's family history includes Heart disease in her father and mother; Hypertension in her brother and sister.    Medications and Allergies     Medications  Outpatient Medications Marked as Taking for the 10/23/23 encounter (Office Visit) with Sammi Butt, DERICK, FNP   Medication Sig Dispense Refill    [DISCONTINUED] chlorthalidone (HYGROTEN) 25 MG Tab TAKE 1 TABLET BY MOUTH  DAILY IN THE MORNING 30 tablet 0    [DISCONTINUED] rosuvastatin (CRESTOR) 10 MG tablet Take 1 tablet (10 mg total) by mouth every evening. 90 tablet 1       Allergies  Review of patient's allergies indicates:  No Known Allergies    Physical Examination     Vitals:    10/23/23 0825 10/23/23 0828 10/23/23 0900   BP: (!) 145/79 (!) 147/80 (!) 149/84   BP Location: Left arm Right arm Left arm   Patient Position: Sitting Sitting Sitting   BP Method: Large (Automatic) Large (Automatic) Large (Automatic)   Pulse: 66     Resp: 18     Temp: 97.5 °F (36.4 °C)     TempSrc: Oral     SpO2: 96%     Weight: 72.5 kg (159 lb 12.8 oz)     Height: 5' 3" (1.6 m)       Physical Exam  Vitals and nursing note reviewed.   Constitutional:       General: She is not in acute distress.  HENT:      Nose: Nose normal.      Mouth/Throat:      Mouth: Mucous membranes are moist. "   Eyes:      Pupils: Pupils are equal, round, and reactive to light.   Cardiovascular:      Rate and Rhythm: Normal rate and regular rhythm.      Pulses: Normal pulses.      Heart sounds: Normal heart sounds. No murmur heard.  Pulmonary:      Effort: Pulmonary effort is normal. No respiratory distress.      Breath sounds: Normal breath sounds. No wheezing, rhonchi or rales.   Chest:      Chest wall: No tenderness.   Abdominal:      General: Bowel sounds are normal.      Palpations: Abdomen is soft.   Musculoskeletal:         General: Normal range of motion.      Cervical back: Normal range of motion and neck supple.      Right lower leg: No edema.      Left lower leg: No edema.   Skin:     General: Skin is warm and dry.   Neurological:      General: No focal deficit present.      Mental Status: She is alert and oriented to person, place, and time.          Assessment and Plan (including Health Maintenance)      Problem List  [employer]  Document Outside HM   :    Plan:         Health Maintenance Due   Topic Date Due    Hepatitis C Screening  Never done    Shingles Vaccine (1 of 2) Never done       Problem List Items Addressed This Visit          Cardiac/Vascular    Hyperlipidemia (Chronic)    Relevant Medications    rosuvastatin (CRESTOR) 10 MG tablet    Other Relevant Orders    Lipid Panel    Hypertension (Chronic)    Relevant Medications    chlorthalidone (HYGROTEN) 25 MG Tab       Endocrine    Age-related osteoporosis without current pathological fracture (Chronic)    Overview     Bone density currently shows osteopenia after several years of treatment with Prolia. Pt has not had med in 2 years.          Relevant Orders    Comprehensive Metabolic Panel    Vitamin D     Other Visit Diagnoses       Essential hypertension    -  Primary    Relevant Orders    Comprehensive Metabolic Panel    CBC Auto Differential    Lipid Panel    Microalbumin/Creatinine Ratio, Urine    POCT URINALYSIS W/O SCOPE (Completed)    Body  mass index 28.0-28.9, adult              Essential hypertension  -     Comprehensive Metabolic Panel; Future; Expected date: 10/23/2023  -     CBC Auto Differential; Future; Expected date: 10/23/2023  -     Lipid Panel; Future; Expected date: 10/23/2023  -     Microalbumin/Creatinine Ratio, Urine  -     POCT URINALYSIS W/O SCOPE    Mixed hyperlipidemia  -     Lipid Panel; Future; Expected date: 10/23/2023  -     rosuvastatin (CRESTOR) 10 MG tablet; Take 1 tablet (10 mg total) by mouth every evening.  Dispense: 90 tablet; Refill: 1    Age-related osteoporosis without current pathological fracture  -     Comprehensive Metabolic Panel; Future; Expected date: 10/23/2023  -     Vitamin D; Future; Expected date: 10/23/2023    Primary hypertension  -     chlorthalidone (HYGROTEN) 25 MG Tab; Take 1.5 tablets (37.5 mg total) by mouth once daily.  Dispense: 135 tablet; Refill: 1    Body mass index 28.0-28.9, adult       Health Maintenance Topics with due status: Not Due       Topic Last Completion Date    TETANUS VACCINE 09/01/2016    Lipid Panel 01/27/2023    DEXA Scan 02/15/2023           Future Appointments   Date Time Provider Department Center   1/26/2024  3:00 PM AWV NURSE, [facility] VIVIEN Lama        Follow up in about 6 months (around 4/23/2024).     Signature:  Sammi Butt DNP, FNP  [address] [place] [address]  Phone #: [phone]  Fax #: [phone]    Date of encounter: 10/23/23    Patient Instructions   Increase chlorthalidone to 37.5 mg daily and monitor blood pressure. Monitor blood pressure for 2 weeks. Make sure that you have empty bladder, resting for 5 minutes, and feet flat on the floor.  Will call pt with lab results. Recommend monthly breast exams. Recommend exercise 30 minutes per day most days of the week. Follow up in 6 months for chronic medical problems.

## 2023-10-23 NOTE — PATIENT INSTRUCTIONS
Increase chlorthalidone to 37.5 mg daily and monitor blood pressure. Monitor blood pressure for 2 weeks. Make sure that you have empty bladder, resting for 5 minutes, and feet flat on the floor.  Will call pt with lab results. Recommend monthly breast exams. Recommend exercise 30 minutes per day most days of the week. Follow up in 6 months for chronic medical problems.

## 2023-10-25 ENCOUNTER — TELEPHONE (OUTPATIENT)
Dept: FAMILY MEDICINE | Facility: CLINIC | Age: 73
End: 2023-10-25
Payer: MEDICARE

## 2023-10-25 VITALS — DIASTOLIC BLOOD PRESSURE: 70 MMHG | SYSTOLIC BLOOD PRESSURE: 137 MMHG

## 2023-10-25 NOTE — TELEPHONE ENCOUNTER
/70 this morning.  
How Severe Is Your Skin Lesion?: mild
Is This A New Presentation, Or A Follow-Up?: Skin Lesion

## 2023-11-02 ENCOUNTER — PATIENT MESSAGE (OUTPATIENT)
Dept: FAMILY MEDICINE | Facility: CLINIC | Age: 73
End: 2023-11-02
Payer: MEDICARE

## 2023-11-03 ENCOUNTER — PATIENT MESSAGE (OUTPATIENT)
Dept: FAMILY MEDICINE | Facility: CLINIC | Age: 73
End: 2023-11-03
Payer: MEDICARE

## 2023-11-11 ENCOUNTER — PATIENT MESSAGE (OUTPATIENT)
Dept: FAMILY MEDICINE | Facility: CLINIC | Age: 73
End: 2023-11-11
Payer: MEDICARE

## 2023-12-09 DIAGNOSIS — Z71.89 COMPLEX CARE COORDINATION: ICD-10-CM

## 2024-04-08 DIAGNOSIS — Z12.31 SCREENING MAMMOGRAM FOR BREAST CANCER: Primary | ICD-10-CM

## 2024-04-17 LAB — BCS RECOMMENDATION EXT: NORMAL

## 2024-05-07 ENCOUNTER — OFFICE VISIT (OUTPATIENT)
Dept: FAMILY MEDICINE | Facility: CLINIC | Age: 74
End: 2024-05-07
Payer: MEDICARE

## 2024-05-07 VITALS
TEMPERATURE: 98 F | RESPIRATION RATE: 18 BRPM | HEART RATE: 79 BPM | HEIGHT: 63 IN | BODY MASS INDEX: 28.95 KG/M2 | WEIGHT: 163.38 LBS | OXYGEN SATURATION: 97 % | SYSTOLIC BLOOD PRESSURE: 144 MMHG | DIASTOLIC BLOOD PRESSURE: 80 MMHG

## 2024-05-07 DIAGNOSIS — E78.2 MIXED HYPERLIPIDEMIA: ICD-10-CM

## 2024-05-07 DIAGNOSIS — E55.9 VITAMIN D DEFICIENCY: ICD-10-CM

## 2024-05-07 DIAGNOSIS — E87.6 HYPOKALEMIA: ICD-10-CM

## 2024-05-07 DIAGNOSIS — I10 PRIMARY HYPERTENSION: Primary | ICD-10-CM
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
## 2024-05-07 DIAGNOSIS — Z87.440 HISTORY OF UTI: ICD-10-CM

## 2024-05-07 LAB
25(OH)D3 SERPL-MCNC: 26.4 NG/ML
ALBUMIN SERPL BCP-MCNC: 3.9 G/DL (ref 3.5–5)
ALBUMIN/GLOB SERPL: 1.1 {RATIO}
ALP SERPL-CCNC: 59 U/L (ref 55–142)
ALT SERPL W P-5'-P-CCNC: 23 U/L (ref 13–56)
ANION GAP SERPL CALCULATED.3IONS-SCNC: 11 MMOL/L (ref 7–16)
AST SERPL W P-5'-P-CCNC: 16 U/L (ref 15–37)
BASOPHILS # BLD AUTO: 0.08 K/UL (ref 0–0.2)
BASOPHILS NFR BLD AUTO: 1.1 % (ref 0–1)
BILIRUB SERPL-MCNC: 0.6 MG/DL (ref ?–1.2)
BILIRUB UR QL STRIP: NEGATIVE
BUN SERPL-MCNC: 15 MG/DL (ref 7–18)
BUN/CREAT SERPL: 14 (ref 6–20)
CALCIUM SERPL-MCNC: 9.8 MG/DL (ref 8.5–10.1)
CHLORIDE SERPL-SCNC: 102 MMOL/L (ref 98–107)
CHOLEST SERPL-MCNC: 156 MG/DL (ref 0–200)
CHOLEST/HDLC SERPL: 2.6 {RATIO}
CLARITY UR: CLEAR
CO2 SERPL-SCNC: 31 MMOL/L (ref 21–32)
COLOR UR: COLORLESS
CREAT SERPL-MCNC: 1.05 MG/DL (ref 0.55–1.02)
DIFFERENTIAL METHOD BLD: ABNORMAL
EGFR (NO RACE VARIABLE) (RUSH/TITUS): 56 ML/MIN/1.73M2
EOSINOPHIL # BLD AUTO: 0.24 K/UL (ref 0–0.5)
EOSINOPHIL NFR BLD AUTO: 3.3 % (ref 1–4)
ERYTHROCYTE [DISTWIDTH] IN BLOOD BY AUTOMATED COUNT: 13.2 % (ref 11.5–14.5)
GLOBULIN SER-MCNC: 3.6 G/DL (ref 2–4)
GLUCOSE SERPL-MCNC: 134 MG/DL (ref 74–106)
GLUCOSE UR STRIP-MCNC: NORMAL MG/DL
HCT VFR BLD AUTO: 42.9 % (ref 38–47)
HDLC SERPL-MCNC: 60 MG/DL (ref 40–60)
HGB BLD-MCNC: 13.9 G/DL (ref 12–16)
IMM GRANULOCYTES # BLD AUTO: 0.03 K/UL (ref 0–0.04)
IMM GRANULOCYTES NFR BLD: 0.4 % (ref 0–0.4)
KETONES UR STRIP-SCNC: NEGATIVE MG/DL
LDLC SERPL CALC-MCNC: 59 MG/DL
LDLC/HDLC SERPL: 1 {RATIO}
LEUKOCYTE ESTERASE UR QL STRIP: NEGATIVE
LYMPHOCYTES # BLD AUTO: 2.61 K/UL (ref 1–4.8)
LYMPHOCYTES NFR BLD AUTO: 35.6 % (ref 27–41)
MCH RBC QN AUTO: 29.1 PG (ref 27–31)
MCHC RBC AUTO-ENTMCNC: 32.4 G/DL (ref 32–36)
MCV RBC AUTO: 89.7 FL (ref 80–96)
MONOCYTES # BLD AUTO: 0.64 K/UL (ref 0–0.8)
MONOCYTES NFR BLD AUTO: 8.7 % (ref 2–6)
MPC BLD CALC-MCNC: 12.1 FL (ref 9.4–12.4)
NEUTROPHILS # BLD AUTO: 3.74 K/UL (ref 1.8–7.7)
NEUTROPHILS NFR BLD AUTO: 50.9 % (ref 53–65)
NITRITE UR QL STRIP: NEGATIVE
NONHDLC SERPL-MCNC: 96 MG/DL
NRBC # BLD AUTO: 0 X10E3/UL
NRBC, AUTO (.00): 0 %
PH UR STRIP: 6.5 PH UNITS
PLATELET # BLD AUTO: 241 K/UL (ref 150–400)
POTASSIUM SERPL-SCNC: 3 MMOL/L (ref 3.5–5.1)
PROT SERPL-MCNC: 7.5 G/DL (ref 6.4–8.2)
PROT UR QL STRIP: NEGATIVE
RBC # BLD AUTO: 4.78 M/UL (ref 4.2–5.4)
RBC # UR STRIP: NEGATIVE /UL
SODIUM SERPL-SCNC: 141 MMOL/L (ref 136–145)
SP GR UR STRIP: 1.01
TRIGL SERPL-MCNC: 185 MG/DL (ref 35–150)
UROBILINOGEN UR STRIP-ACNC: NORMAL MG/DL
VLDLC SERPL-MCNC: 37 MG/DL
WBC # BLD AUTO: 7.34 K/UL (ref 4.5–11)

## 2024-05-07 PROCEDURE — 80061 LIPID PANEL: CPT | Mod: ,,, | Performed by: CLINICAL MEDICAL LABORATORY

## 2024-05-07 PROCEDURE — 81003 URINALYSIS AUTO W/O SCOPE: CPT | Mod: QW,,, | Performed by: CLINICAL MEDICAL LABORATORY

## 2024-05-07 PROCEDURE — 85025 COMPLETE CBC W/AUTO DIFF WBC: CPT | Mod: ,,, | Performed by: CLINICAL MEDICAL LABORATORY

## 2024-05-07 PROCEDURE — 99214 OFFICE O/P EST MOD 30 MIN: CPT | Mod: ,,, | Performed by: NURSE PRACTITIONER

## 2024-05-07 PROCEDURE — 80053 COMPREHEN METABOLIC PANEL: CPT | Mod: ,,, | Performed by: CLINICAL MEDICAL LABORATORY

## 2024-05-07 PROCEDURE — 82306 VITAMIN D 25 HYDROXY: CPT | Mod: ,,, | Performed by: CLINICAL MEDICAL LABORATORY

## 2024-05-07 RX ORDER — ROSUVASTATIN CALCIUM 10 MG/1
10 TABLET, COATED ORAL NIGHTLY
Qty: 90 TABLET | Refills: 1 | Status: SHIPPED | OUTPATIENT
Start: 2024-05-07

## 2024-05-07 RX ORDER — CHLORTHALIDONE 25 MG/1
37.5 TABLET ORAL DAILY
Qty: 135 TABLET | Refills: 1 | Status: SHIPPED | OUTPATIENT
Start: 2024-05-07

## 2024-05-07 NOTE — LETTER
AUTHORIZATION FOR RELEASE OF   CONFIDENTIAL INFORMATION    Dear [facility] Records,    We are seeing Dayana Bae, date of birth 1950, in the clinic at [facility]. Sammi Butt, DERICK, MARIO is the patient's PCP. Dayana Bae has an outstanding lab/procedure at the time we reviewed her chart. In order to help keep her health information updated, she has authorized us to request the following medical record(s):        (  )  MAMMOGRAM                                      (  )  COLONOSCOPY      (  )  PAP SMEAR                                          (  )  OUTSIDE LAB RESULTS     (  )  DEXA SCAN                                          (  )  EYE EXAM            (  )  FOOT EXAM                                          ( x )  PAST 2 ER VISITS     (  )  OUTSIDE IMMUNIZATIONS                 (  )  _______________         Please fax records to [facility], Mann, Betsy L., DERICK, MARIO, [phone]     If you have any questions, please contact Sara at [phone].           Patient Name: Dayana Bae  : 1950  Patient Phone #: [phone]

## 2024-05-07 NOTE — PROGRESS NOTES
Sammi Butt DNP, FNP    [address] [place] [address]     PATIENT NAME: Dayana Bae  : 1950  DATE: 24  MRN: [id number]      Billing Provider: Sammi Butt DNP, FNP  Level of Service: [place] OFFICE/OUTPT VISIT, EST, LEVL IV, 30-39 MIN  Patient PCP Information       Provider PCP Type    Sammi Butt DNP, FNP General            Reason for Visit / Chief Complaint: Follow-up (Denies any problems. Patient is not fasting for labs.) and Medication Refill       Update PCP  Update Chief Complaint         History of Present Illness / Problem Focused Workflow     Dayana Bae presents to the clinic with Follow-up (Denies any problems. Patient is not fasting for labs.) and Medication Refill   Pt has had 2 recent ED visits where her potassium was found to be low. Would like it rechecked today.     Hypertension  The current episode started more than 1 year ago. The problem has been waxing and waning since onset. The problem is resistant. Pertinent negatives include no anxiety, blurred vision, chest pain, headaches, malaise/fatigue, neck pain, orthopnea, palpitations, peripheral edema, PND, shortness of breath or sweats. Risk factors for coronary artery disease include dyslipidemia and family history. The current treatment provides moderate improvement. Compliance problems include diet and exercise.        Review of Systems     Review of Systems   Constitutional:  Negative for activity change, appetite change, chills, fatigue, fever and malaise/fatigue.   HENT:  Negative for nasal congestion, ear pain, hearing loss, postnasal drip and sore throat.    Eyes:  Negative for blurred vision.   Respiratory:  Negative for cough, chest tightness, shortness of breath and wheezing.    Cardiovascular:  Negative for chest pain, palpitations, orthopnea, leg swelling, PND and claudication.   Gastrointestinal:  Negative for abdominal pain, change in bowel habit,  constipation, diarrhea, nausea and vomiting.   Genitourinary:  Negative for dysuria.   Musculoskeletal:  Negative for arthralgias, back pain, gait problem, myalgias and neck pain.   Integumentary:  Negative for rash.   Neurological:  Negative for weakness and headaches.   Psychiatric/Behavioral:  Negative for suicidal ideas. The patient is not nervous/anxious.         Medical / Social / Family History     Past Medical History:   Diagnosis Date    Cataract     Hard of hearing     Hyperlipidemia     Hypertension     Osteoporosis     Sleep apnea, unspecified        Past Surgical History:   Procedure Laterality Date    CARPAL TUNNEL RELEASE      TUBAL LIGATION         Social History  Ms. Dayana Bae  reports that she has never smoked. She has never been exposed to tobacco smoke. She has never used smokeless tobacco. She reports that she does not currently use alcohol. She reports that she does not use drugs.    Family History  Ms. Dayana Bae's family history includes Heart disease in her father and mother; Hypertension in her brother and sister.    Medications and Allergies     Medications  Outpatient Medications Marked as Taking for the 5/7/24 encounter (Office Visit) with Sammi Butt, DERICK, FNP   Medication Sig Dispense Refill    [DISCONTINUED] chlorthalidone (HYGROTEN) 25 MG Tab Take 1.5 tablets (37.5 mg total) by mouth once daily. 135 tablet 1    [DISCONTINUED] rosuvastatin (CRESTOR) 10 MG tablet Take 1 tablet (10 mg total) by mouth every evening. 90 tablet 1       Allergies  Review of patient's allergies indicates:  No Known Allergies    Physical Examination     Vitals:    05/07/24 0948 05/07/24 0956 05/07/24 1013   BP: (!) 165/63 (!) 144/81 (!) 144/80  Comment: BP log given   BP Location: Left arm Right arm Right arm   Patient Position: Sitting Sitting Sitting   BP Method: Large (Automatic) Large (Automatic) Large (Automatic)   Pulse: 79     Resp: 18     Temp: 97.7 °F (36.5 °C)     TempSrc:  "Oral     SpO2: 97%     Weight: 74.1 kg (163 lb 6.4 oz)     Height: 5' 3" (1.6 m)       Physical Exam  Vitals and nursing note reviewed.   Constitutional:       General: She is not in acute distress.     Appearance: Normal appearance. She is not ill-appearing.   Eyes:      Extraocular Movements: Extraocular movements intact.      Pupils: Pupils are equal, round, and reactive to light.   Cardiovascular:      Rate and Rhythm: Normal rate and regular rhythm.      Heart sounds: Normal heart sounds.   Pulmonary:      Effort: Pulmonary effort is normal.      Breath sounds: Normal breath sounds.   Abdominal:      General: Bowel sounds are normal.      Palpations: Abdomen is soft.   Musculoskeletal:         General: Normal range of motion.   Skin:     Findings: No rash.   Neurological:      General: No focal deficit present.      Mental Status: She is alert and oriented to person, place, and time. Mental status is at baseline.   Psychiatric:         Mood and Affect: Mood normal.         Behavior: Behavior normal.          Assessment and Plan (including Health Maintenance)      Problem List  [employer]  Document Outside HM   :    Plan:         Health Maintenance Due   Topic Date Due    Hepatitis C Screening  Never done    Shingles Vaccine (1 of 2) Never done    RSV Vaccine (Age 60+ and Pregnant patients) (1 - 1-dose 60+ series) Never done       Problem List Items Addressed This Visit          Cardiac/Vascular    Hyperlipidemia (Chronic)    Relevant Medications    rosuvastatin (CRESTOR) 10 MG tablet    Hypertension - Primary (Chronic)    Relevant Medications    chlorthalidone (HYGROTEN) 25 MG Tab    Other Relevant Orders    Comprehensive Metabolic Panel (Completed)    CBC Auto Differential (Completed)    Lipid Panel (Completed)     Other Visit Diagnoses       History of UTI        Relevant Orders    Urinalysis, Reflex to Urine Culture (Completed)    Vitamin D deficiency        Relevant Medications    ergocalciferol " (ERGOCALCIFEROL) 50,000 unit Cap    Other Relevant Orders    Vitamin D (Completed)    Hypokalemia        Relevant Medications    potassium chloride SA (K-DUR,KLOR-CON) 20 MEQ tablet    Fall, initial encounter              Primary hypertension  -     chlorthalidone (HYGROTEN) 25 MG Tab; Take 1.5 tablets (37.5 mg total) by mouth once daily.  Dispense: 135 tablet; Refill: 1  -     Comprehensive Metabolic Panel; Future; Expected date: 05/07/2024  -     CBC Auto Differential; Future; Expected date: 05/07/2024  -     Lipid Panel; Future; Expected date: 05/07/2024    Mixed hyperlipidemia  -     rosuvastatin (CRESTOR) 10 MG tablet; Take 1 tablet (10 mg total) by mouth every evening.  Dispense: 90 tablet; Refill: 1    History of UTI  -     Urinalysis, Reflex to Urine Culture    Vitamin D deficiency  -     Vitamin D; Future; Expected date: 05/07/2024  -     ergocalciferol (ERGOCALCIFEROL) 50,000 unit Cap; Take 1 capsule (50,000 Units total) by mouth every 7 days.  Dispense: 12 capsule; Refill: 0    Hypokalemia  -     potassium chloride SA (K-DUR,KLOR-CON) 20 MEQ tablet; Take 1 tablet (20 mEq total) by mouth once daily.  Dispense: 90 tablet; Refill: 0    Fall, initial encounter       Health Maintenance Topics with due status: Not Due       Topic Last Completion Date    TETANUS VACCINE 09/01/2016    DEXA Scan 02/15/2023    Lipid Panel 05/07/2024         No future appointments.       Follow up in about 6 months (around 11/7/2024).     Signature:  Sammi Butt DNP, FNP  [address] [place] [address]  Phone #: [phone]  Fax #: [phone]    Date of encounter: 5/7/24    Patient Instructions   Continue current medication regimen. Will call pt with lab results. Recommend monthly breast exams. Recommend exercise 30 minutes per day most days of the week. Follow up in 6 months for chronic medical problems.

## 2024-05-08 ENCOUNTER — TELEPHONE (OUTPATIENT)
Dept: FAMILY MEDICINE | Facility: CLINIC | Age: 74
End: 2024-05-08
Payer: MEDICARE

## 2024-05-08 VITALS — DIASTOLIC BLOOD PRESSURE: 73 MMHG | SYSTOLIC BLOOD PRESSURE: 132 MMHG

## 2024-05-08 RX ORDER — POTASSIUM CHLORIDE 20 MEQ/1
20 TABLET, EXTENDED RELEASE ORAL DAILY
Qty: 90 TABLET | Refills: 0 | Status: SHIPPED | OUTPATIENT
Start: 2024-05-08

## 2024-05-08 RX ORDER — ERGOCALCIFEROL 1.25 MG/1
50000 CAPSULE ORAL
Qty: 12 CAPSULE | Refills: 0 | Status: SHIPPED | OUTPATIENT
Start: 2024-05-08

## 2024-05-11 NOTE — PATIENT INSTRUCTIONS
Continue current medication regimen. Will call pt with lab results. Recommend monthly breast exams. Recommend exercise 30 minutes per day most days of the week. Follow up in 6 months for chronic medical problems.

## 2024-05-21 NOTE — TELEPHONE ENCOUNTER
Patient dropped off BP log at the clinic. Scanned into  with MARISSA Agudelo notes. Instructed patient to continue to monitor BP. Verbalized understanding.

## 2024-07-09 DIAGNOSIS — Z71.89 COMPLEX CARE COORDINATION: ICD-10-CM

## 2024-07-20 ENCOUNTER — PATIENT MESSAGE (OUTPATIENT)
Dept: FAMILY MEDICINE | Facility: CLINIC | Age: 74
End: 2024-07-20
Payer: MEDICARE

## 2024-07-22 NOTE — TELEPHONE ENCOUNTER
Pt will need to schedule appt for repeat labs/visit. Pt needs to monitor blood pressure. We can discuss at next visit. Will need to also obtain ED records for evaluation.

## 2024-07-24 ENCOUNTER — OFFICE VISIT (OUTPATIENT)
Dept: FAMILY MEDICINE | Facility: CLINIC | Age: 74
End: 2024-07-24
Payer: MEDICARE

## 2024-07-24 VITALS
HEIGHT: 63 IN | RESPIRATION RATE: 18 BRPM | WEIGHT: 163 LBS | TEMPERATURE: 98 F | HEART RATE: 74 BPM | SYSTOLIC BLOOD PRESSURE: 137 MMHG | BODY MASS INDEX: 28.88 KG/M2 | DIASTOLIC BLOOD PRESSURE: 80 MMHG | OXYGEN SATURATION: 95 %

## 2024-07-24 DIAGNOSIS — E83.42 HYPOMAGNESEMIA: Primary | ICD-10-CM

## 2024-07-24 DIAGNOSIS — E87.6 HYPOKALEMIA: ICD-10-CM

## 2024-07-24 LAB
ANION GAP SERPL CALCULATED.3IONS-SCNC: 7 MMOL/L (ref 7–16)
BUN SERPL-MCNC: 12 MG/DL (ref 7–18)
BUN/CREAT SERPL: 12 (ref 6–20)
CALCIUM SERPL-MCNC: 9.6 MG/DL (ref 8.5–10.1)
CHLORIDE SERPL-SCNC: 101 MMOL/L (ref 98–107)
CO2 SERPL-SCNC: 33 MMOL/L (ref 21–32)
CREAT SERPL-MCNC: 1.04 MG/DL (ref 0.55–1.02)
EGFR (NO RACE VARIABLE) (RUSH/TITUS): 56 ML/MIN/1.73M2
GLUCOSE SERPL-MCNC: 113 MG/DL (ref 74–106)
MAGNESIUM SERPL-MCNC: 2.1 MG/DL (ref 1.7–2.3)
POTASSIUM SERPL-SCNC: 4.2 MMOL/L (ref 3.5–5.1)
SODIUM SERPL-SCNC: 137 MMOL/L (ref 136–145)

## 2024-07-24 PROCEDURE — 99214 OFFICE O/P EST MOD 30 MIN: CPT | Mod: ,,, | Performed by: NURSE PRACTITIONER

## 2024-07-24 PROCEDURE — 80048 BASIC METABOLIC PNL TOTAL CA: CPT | Mod: ,,, | Performed by: CLINICAL MEDICAL LABORATORY

## 2024-07-24 PROCEDURE — 83735 ASSAY OF MAGNESIUM: CPT | Mod: ,,, | Performed by: CLINICAL MEDICAL LABORATORY

## 2024-07-24 RX ORDER — LANOLIN ALCOHOL/MO/W.PET/CERES
400 CREAM (GRAM) TOPICAL DAILY
COMMUNITY
Start: 2024-07-20

## 2024-07-24 RX ORDER — POTASSIUM CHLORIDE 1500 MG/1
20 TABLET, EXTENDED RELEASE ORAL DAILY
COMMUNITY
Start: 2024-05-08

## 2024-07-24 RX ORDER — ONDANSETRON 4 MG/1
4 TABLET, ORALLY DISINTEGRATING ORAL EVERY 8 HOURS PRN
COMMUNITY
Start: 2024-07-20

## 2024-07-24 NOTE — PATIENT INSTRUCTIONS
Hold chlorthalidone. Increase fluids especially with sports drinks with potassium. Continue potassium supplement 2 times a day. Follow up in 2 weeks.

## 2024-07-24 NOTE — PROGRESS NOTES
Sammi Butt DNP, FNP    [address] [place] [address]     PATIENT NAME: Dayana Bae  : 1950  DATE: 24  MRN: [id number]      Billing Provider: Sammi Butt DNP, FNP  Level of Service:   Patient PCP Information       Provider PCP Type    Sammi Butt DNP, FNP General            Reason for Visit / Chief Complaint: Abnormal Lab (Pt went to [facility]  and  with diarrhea and weakness.  Had low Mg, low Vit D and low K.  Here for follow up.  She was given IV Mg and Kcl in the ER and sent home on po supplements.  Still experiencing nausea.  She is continuing to take Chlorthalidone 1.5 tab ), Fatigue, and Nausea       Update PCP  Update Chief Complaint         History of Present Illness / Problem Focused Workflow     Dayana Bae presents to the clinic with Abnormal Lab (Pt went to [facility]  and  with diarrhea and weakness.  Had low Mg, low Vit D and low K.  Here for follow up.  She was given IV Mg and Kcl in the ER and sent home on po supplements.  Still experiencing nausea.  She is continuing to take Chlorthalidone 1.5 tab ), Fatigue, and Nausea     Fatigue  Associated symptoms include fatigue and nausea. Pertinent negatives include no abdominal pain, arthralgias, change in bowel habit, chest pain, chills, congestion, coughing, fever, headaches, myalgias, rash, sore throat, vomiting or weakness.   Nausea  Associated symptoms include fatigue and nausea. Pertinent negatives include no abdominal pain, arthralgias, change in bowel habit, chest pain, chills, congestion, coughing, fever, headaches, myalgias, rash, sore throat, vomiting or weakness.       Review of Systems     Review of Systems   Constitutional:  Positive for fatigue. Negative for activity change, appetite change, chills and fever.   HENT:  Negative for nasal congestion, ear pain, hearing loss, postnasal drip and sore throat.    Respiratory:  Negative for cough,  chest tightness, shortness of breath and wheezing.    Cardiovascular:  Negative for chest pain, palpitations, leg swelling and claudication.   Gastrointestinal:  Positive for nausea. Negative for abdominal pain, change in bowel habit, constipation, diarrhea and vomiting.   Genitourinary:  Negative for dysuria.   Musculoskeletal:  Negative for arthralgias, back pain, gait problem and myalgias.   Integumentary:  Negative for rash.   Neurological:  Negative for weakness and headaches.   Psychiatric/Behavioral:  Negative for suicidal ideas. The patient is not nervous/anxious.         Medical / Social / Family History     Past Medical History:   Diagnosis Date    Cataract     Hard of hearing     Hyperlipidemia     Hypertension     Osteoporosis     Sleep apnea, unspecified        Past Surgical History:   Procedure Laterality Date    CARPAL TUNNEL RELEASE      TUBAL LIGATION         Social History  Ms. Dayana Bae  reports that she has never smoked. She has never been exposed to tobacco smoke. She has never used smokeless tobacco. She reports that she does not currently use alcohol. She reports that she does not use drugs.    Family History  Ms. Dayana Bae's family history includes Heart disease in her father and mother; Hypertension in her brother and sister.    Medications and Allergies     Medications  Outpatient Medications Marked as Taking for the 7/24/24 encounter (Office Visit) with Sammi Butt, DERICK, FNP   Medication Sig Dispense Refill    chlorthalidone (HYGROTEN) 25 MG Tab Take 1.5 tablets (37.5 mg total) by mouth once daily. 135 tablet 1    ergocalciferol (ERGOCALCIFEROL) 50,000 unit Cap Take 1 capsule (50,000 Units total) by mouth every 7 days. 12 capsule 0    magnesium oxide (MAG-OX) 400 mg (241.3 mg magnesium) tablet Take 400 mg by mouth once daily.      ondansetron (ZOFRAN-ODT) 4 MG TbDL Take 4 mg by mouth every 8 (eight) hours as needed.      potassium chloride (K-TAB) 20 mEq Take 20  "mEq by mouth 2 (two) times a day.      rosuvastatin (CRESTOR) 10 MG tablet Take 1 tablet (10 mg total) by mouth every evening. 90 tablet 1       Allergies  Review of patient's allergies indicates:  No Known Allergies    Physical Examination     Vitals:    07/24/24 1327   BP: 137/80   BP Location: Left arm   Patient Position: Sitting   BP Method: Large (Automatic)   Pulse: 74   Resp: 18   Temp: 98.1 °F (36.7 °C)   TempSrc: Oral   SpO2: 95%   Weight: 73.9 kg (163 lb)   Height: 5' 3" (1.6 m)     Physical Exam  Vitals and nursing note reviewed.   Constitutional:       General: She is not in acute distress.  HENT:      Nose: Nose normal.      Mouth/Throat:      Mouth: Mucous membranes are moist.   Eyes:      Pupils: Pupils are equal, round, and reactive to light.   Cardiovascular:      Rate and Rhythm: Normal rate and regular rhythm.      Pulses: Normal pulses.      Heart sounds: Normal heart sounds. No murmur heard.  Pulmonary:      Effort: Pulmonary effort is normal. No respiratory distress.      Breath sounds: Normal breath sounds. No wheezing, rhonchi or rales.   Chest:      Chest wall: No tenderness.   Abdominal:      General: Bowel sounds are normal.      Palpations: Abdomen is soft.   Musculoskeletal:         General: Normal range of motion.      Cervical back: Normal range of motion and neck supple.      Right lower leg: No edema.      Left lower leg: No edema.   Skin:     General: Skin is warm and dry.   Neurological:      General: No focal deficit present.      Mental Status: She is alert and oriented to person, place, and time.          Assessment and Plan (including Health Maintenance)      Problem List  Smart Sets  Document Outside HM   :    Plan:         Health Maintenance Due   Topic Date Due    Hepatitis C Screening  Never done    Shingles Vaccine (1 of 2) Never done    RSV Vaccine (Age 60+ and Pregnant patients) (1 - 1-dose 60+ series) Never done       Problem List Items Addressed This Visit  "   None  Visit Diagnoses       Hypomagnesemia    -  Primary    Relevant Orders    Magnesium    Hypokalemia        Relevant Orders    Basic Metabolic Panel          Hypomagnesemia  -     Magnesium; Future; Expected date: 07/24/2024    Hypokalemia  -     Basic Metabolic Panel; Future; Expected date: 07/24/2024       Health Maintenance Topics with due status: Not Due       Topic Last Completion Date    TETANUS VACCINE 09/01/2016    DEXA Scan 02/15/2023    Influenza Vaccine 10/23/2023    Lipid Panel 05/07/2024           Future Appointments   Date Time Provider Department Center   8/6/2024  3:00 PM Sammi Butt DNP, [facility]        Follow up in about 2 weeks (around 8/7/2024).     Signature:  Sammi Butt DNP, FNP  [address] [place] [address]  Phone #: [phone]  Fax #: [phone]    Date of encounter: 7/24/24    Patient Instructions   Hold chlorthalidone. Increase fluids especially with sports drinks with potassium. Continue potassium supplement 2 times a day. Follow up in 2 weeks.

## 2024-07-25 ENCOUNTER — TELEPHONE (OUTPATIENT)
Dept: FAMILY MEDICINE | Facility: CLINIC | Age: 74
End: 2024-07-25
Payer: MEDICARE

## 2024-07-25 ENCOUNTER — HOSPITAL ENCOUNTER (EMERGENCY)
Facility: HOSPITAL | Age: 74
Discharge: HOME | End: 2024-07-25
Attending: EMERGENCY MEDICINE
Payer: MEDICARE

## 2024-07-25 VITALS
DIASTOLIC BLOOD PRESSURE: 69 MMHG | OXYGEN SATURATION: 95 % | SYSTOLIC BLOOD PRESSURE: 124 MMHG | BODY MASS INDEX: 28.88 KG/M2 | HEART RATE: 67 BPM | RESPIRATION RATE: 12 BRPM | TEMPERATURE: 99 F | HEIGHT: 63 IN | WEIGHT: 163 LBS

## 2024-07-25 DIAGNOSIS — E87.6 HYPOKALEMIA: ICD-10-CM

## 2024-07-25 DIAGNOSIS — R00.2 PALPITATIONS: Primary | ICD-10-CM

## 2024-07-25 LAB
ALBUMIN SERPL BCP-MCNC: 3.6 G/DL (ref 3.5–5)
ALBUMIN/GLOB SERPL: 1.1 {RATIO}
ALP SERPL-CCNC: 57 U/L (ref 55–142)
ALT SERPL W P-5'-P-CCNC: 31 U/L (ref 13–56)
ANION GAP SERPL CALCULATED.3IONS-SCNC: 9 MMOL/L (ref 7–16)
AST SERPL W P-5'-P-CCNC: 23 U/L (ref 15–37)
BASOPHILS # BLD AUTO: 0.04 K/UL (ref 0–0.2)
BASOPHILS NFR BLD AUTO: 0.7 % (ref 0–1)
BILIRUB SERPL-MCNC: 0.4 MG/DL (ref ?–1.2)
BUN SERPL-MCNC: 10 MG/DL (ref 7–18)
BUN/CREAT SERPL: 11 (ref 6–20)
CALCIUM SERPL-MCNC: 8.4 MG/DL (ref 8.5–10.1)
CHLORIDE SERPL-SCNC: 98 MMOL/L (ref 98–107)
CO2 SERPL-SCNC: 29 MMOL/L (ref 21–32)
CREAT SERPL-MCNC: 0.93 MG/DL (ref 0.55–1.02)
DIFFERENTIAL METHOD BLD: ABNORMAL
EGFR (NO RACE VARIABLE) (RUSH/TITUS): 64 ML/MIN/1.73M2
EOSINOPHIL # BLD AUTO: 0.07 K/UL (ref 0–0.5)
EOSINOPHIL NFR BLD AUTO: 1.2 % (ref 1–4)
ERYTHROCYTE [DISTWIDTH] IN BLOOD BY AUTOMATED COUNT: 12.5 % (ref 11.5–14.5)
GLOBULIN SER-MCNC: 3.4 G/DL (ref 2–4)
GLUCOSE SERPL-MCNC: 107 MG/DL (ref 74–106)
HCT VFR BLD AUTO: 41.8 % (ref 38–47)
HGB BLD-MCNC: 13.8 G/DL (ref 12–16)
IMM GRANULOCYTES # BLD AUTO: 0.02 K/UL (ref 0–0.04)
IMM GRANULOCYTES NFR BLD: 0.3 % (ref 0–0.4)
LYMPHOCYTES # BLD AUTO: 1.65 K/UL (ref 1–4.8)
LYMPHOCYTES NFR BLD AUTO: 28 % (ref 27–41)
MAGNESIUM SERPL-MCNC: 2.1 MG/DL (ref 1.7–2.3)
MCH RBC QN AUTO: 28.5 PG (ref 27–31)
MCHC RBC AUTO-ENTMCNC: 33 G/DL (ref 32–36)
MCV RBC AUTO: 86.4 FL (ref 80–96)
MONOCYTES # BLD AUTO: 0.66 K/UL (ref 0–0.8)
MONOCYTES NFR BLD AUTO: 11.2 % (ref 2–6)
MPC BLD CALC-MCNC: 11.6 FL (ref 9.4–12.4)
NEUTROPHILS # BLD AUTO: 3.45 K/UL (ref 1.8–7.7)
NEUTROPHILS NFR BLD AUTO: 58.6 % (ref 53–65)
NRBC # BLD AUTO: 0 X10E3/UL
NRBC, AUTO (.00): 0 %
NT-PROBNP SERPL-MCNC: 59 PG/ML (ref 1–450)
PLATELET # BLD AUTO: 191 K/UL (ref 150–400)
POTASSIUM SERPL-SCNC: 3.4 MMOL/L (ref 3.5–5.1)
PROT SERPL-MCNC: 7 G/DL (ref 6.4–8.2)
RBC # BLD AUTO: 4.84 M/UL (ref 4.2–5.4)
SODIUM SERPL-SCNC: 133 MMOL/L (ref 136–145)
TROPONIN I SERPL DL<=0.01 NG/ML-MCNC: 6.5 PG/ML
TROPONIN I SERPL DL<=0.01 NG/ML-MCNC: 7.2 PG/ML
TSH SERPL DL<=0.005 MIU/L-ACNC: 2.72 UIU/ML (ref 0.36–3.74)
WBC # BLD AUTO: 5.89 K/UL (ref 4.5–11)

## 2024-07-25 PROCEDURE — 25000003 PHARM REV CODE 250: Performed by: EMERGENCY MEDICINE

## 2024-07-25 PROCEDURE — 85025 COMPLETE CBC W/AUTO DIFF WBC: CPT | Performed by: EMERGENCY MEDICINE

## 2024-07-25 PROCEDURE — 83880 ASSAY OF NATRIURETIC PEPTIDE: CPT | Performed by: EMERGENCY MEDICINE

## 2024-07-25 PROCEDURE — 84443 ASSAY THYROID STIM HORMONE: CPT | Performed by: EMERGENCY MEDICINE

## 2024-07-25 PROCEDURE — 93005 ELECTROCARDIOGRAM TRACING: CPT

## 2024-07-25 PROCEDURE — 80053 COMPREHEN METABOLIC PANEL: CPT | Performed by: EMERGENCY MEDICINE

## 2024-07-25 PROCEDURE — 84484 ASSAY OF TROPONIN QUANT: CPT | Mod: 91 | Performed by: EMERGENCY MEDICINE

## 2024-07-25 PROCEDURE — 99285 EMERGENCY DEPT VISIT HI MDM: CPT | Mod: 25

## 2024-07-25 PROCEDURE — 36415 COLL VENOUS BLD VENIPUNCTURE: CPT | Performed by: EMERGENCY MEDICINE

## 2024-07-25 PROCEDURE — 93010 ELECTROCARDIOGRAM REPORT: CPT | Mod: ,,, | Performed by: INTERNAL MEDICINE

## 2024-07-25 PROCEDURE — 84484 ASSAY OF TROPONIN QUANT: CPT | Performed by: EMERGENCY MEDICINE

## 2024-07-25 PROCEDURE — 83735 ASSAY OF MAGNESIUM: CPT | Performed by: EMERGENCY MEDICINE

## 2024-07-25 RX ORDER — POTASSIUM CHLORIDE 20 MEQ/1
40 TABLET, EXTENDED RELEASE ORAL
Status: COMPLETED | OUTPATIENT
Start: 2024-07-25 | End: 2024-07-25

## 2024-07-25 RX ADMIN — POTASSIUM CHLORIDE 40 MEQ: 1500 TABLET, EXTENDED RELEASE ORAL at 07:07

## 2024-07-25 NOTE — TELEPHONE ENCOUNTER
Reviewed 7/24/24 lab results with pt.  She is decreasing Kcl to 20mEq po daily and continuing to hold Chlorthalidone.  Updating med list

## 2024-07-25 NOTE — ED PROVIDER NOTES
Encounter Date: 2024    SCRIBE #1 NOTE: I, Anna Calista, am scribing for, and in the presence of,  Elpidio Caldera MD.       History     Chief Complaint   Patient presents with    Palpitations     PRESENTS TO ER WITH COMPLAINT OF PALPITATIONS      This pt is a 73 y/o Female that presents to the ED with c/o Palpitations. The pt's caregiver, stated the pt went to [facility] and had her labs checked. She was told her labs were abnormal. The pt stated she went to a  a week ago and was around a lot of people. She started to get Chills, Fever, and nausea with Diarrhea after the . The pt said she does not feel any of those symptoms now in the ED. She is only experiencing weak spells with heart palpitations. The pt's PCP is Sammi Butt.  PMHx consists of HTN, and HLD. Her caregiver mentioned she was taking Bp medications but was just taken off the medications a day ago. The physician thought that may have been the reason she was having palpitations.  There are no other issues to report with this pt at this time.     The history is provided by the patient and a caregiver. No  was used.     Review of patient's allergies indicates:  No Known Allergies  Past Medical History:   Diagnosis Date    Age-related osteoporosis without current pathological fracture 2022    Bone density currently shows osteopenia after several years of treatment with Prolia. Pt has not had med in 2 years.       Cataract     Essential (primary) hypertension     Hard of hearing     Mixed hyperlipidemia     Sleep apnea, unspecified      Past Surgical History:   Procedure Laterality Date    CARPAL TUNNEL RELEASE      TUBAL LIGATION       Family History   Problem Relation Name Age of Onset    Heart disease Mother Marisela     Heart disease Father Croque     Hypertension Sister      Hypertension Brother      Lung disease Brother      Pancreatic cancer Brother      Pancreatic cancer Brother      Colon cancer Brother           had colon surgery and had a blood clot after stopping a medication and clot was cause of death     Social History     Tobacco Use    Smoking status: Never     Passive exposure: Never    Smokeless tobacco: Never   Substance Use Topics    Alcohol use: Not Currently    Drug use: Never     Review of Systems   Constitutional:  Negative for fever.   Respiratory:  Negative for chest tightness.    Cardiovascular:  Positive for palpitations. Negative for chest pain and leg swelling.   Gastrointestinal:  Negative for diarrhea, nausea and vomiting.   Neurological:  Positive for weakness.   All other systems reviewed and are negative.      Physical Exam     Initial Vitals [07/25/24 1648]   BP Pulse Resp Temp SpO2   (!) 156/61 70 18 98.5 °F (36.9 °C) 95 %      MAP       --         Physical Exam    Nursing note and vitals reviewed.  Constitutional: She appears well-developed and well-nourished.   HENT:   Head: Normocephalic and atraumatic.   Eyes: Conjunctivae and EOM are normal. Pupils are equal, round, and reactive to light.   Neck: Neck supple.   Normal range of motion.  Cardiovascular:  Normal rate, regular rhythm, normal heart sounds and intact distal pulses.           Pulmonary/Chest: Breath sounds normal.   Abdominal: Abdomen is soft. Bowel sounds are normal.   Musculoskeletal:         General: Normal range of motion.      Cervical back: Normal range of motion and neck supple.     Neurological: She is alert and oriented to person, place, and time. She has normal strength.   Skin: Skin is warm and dry. Capillary refill takes less than 2 seconds.   Psychiatric: She has a normal mood and affect. Thought content normal.         ED Course   Procedures  Labs Reviewed   COMPREHENSIVE METABOLIC PANEL - Abnormal       Result Value    Sodium 133 (*)     Potassium 3.4 (*)     Chloride 98      CO2 29      Anion Gap 9      Glucose 107 (*)     BUN 10      Creatinine 0.93      BUN/Creatinine Ratio 11      Calcium 8.4 (*)     Total  Protein 7.0      Albumin 3.6      Globulin 3.4      A/G Ratio 1.1      Bilirubin, Total 0.4      Alk Phos 57      ALT 31      AST 23      eGFR 64     CBC WITH DIFFERENTIAL - Abnormal    WBC 5.89      RBC 4.84      Hemoglobin 13.8      Hematocrit 41.8      MCV 86.4      MCH 28.5      MCHC 33.0      RDW 12.5      Platelet Count 191      MPV 11.6      Neutrophils % 58.6      Lymphocytes % 28.0      Monocytes % 11.2 (*)     Eosinophils % 1.2      Basophils % 0.7      Immature Granulocytes % 0.3      nRBC, Auto 0.0      Neutrophils, Abs 3.45      Lymphocytes, Absolute 1.65      Monocytes, Absolute 0.66      Eosinophils, Absolute 0.07      Basophils, Absolute 0.04      Immature Granulocytes, Absolute 0.02      nRBC, Absolute 0.00      Diff Type Auto     NT-PRO NATRIURETIC PEPTIDE - Normal    ProBNP 59     TROPONIN I - Normal    Troponin I High Sensitivity 7.2     MAGNESIUM - Normal    Magnesium 2.1     TSH - Normal    TSH 2.720     TROPONIN I - Normal    Troponin I High Sensitivity 6.5     CBC W/ AUTO DIFFERENTIAL    Narrative:     The following orders were created for panel order CBC auto differential.  Procedure                               Abnormality         Status                     ---------                               -----------         ------                     CBC with Differential[[id number]]       Abnormal            Final result                 Please view results for these tests on the individual orders.     EKG Readings: (Independently Interpreted)   Heart Rate: 65 bpm.   Sinus rhythm   Possible anterior infarct - age undetermined   Lateral ST-T abnormality is nonspecific   Low QRS voltages in precordial leads   Abnormal ECG     ECG Results              EKG 12-lead (Final result)        Collection Time Result Time QRS Duration OHS QTC Calculation    07/25/24 17:36:09 07/26/24 17:12:39 92 421                     Final result by Interface, Lab In [facility] (07/26/24 17:12:48)                   Narrative:     Test Reason : R00.2,    Vent. Rate : 065 BPM     Atrial Rate : 000 BPM     P-R Int : 178 ms          QRS Dur : 092 ms      QT Int : 412 ms       P-R-T Axes : 068 015 078 degrees     QTc Int : 421 ms    Sinus rhythm  Possible anterior infarct - age undetermined  Lateral ST-T abnormality is nonspecific  Low QRS voltages in precordial leads  Abnormal ECG    Confirmed by Denisha PANDEY, Richard JOHNSTON ([id number]) on 7/26/2024 5:12:38 PM    Referred By: [email or web address]   SELF           Confirmed By:Richard Denny MD                                  Imaging Results              X-Ray Chest AP Portable (Final result)  Result time 07/25/24 17:46:55      Final result by Juan Al MD (07/25/24 17:46:55)                   Impression:      No acute process      Electronically signed by: Juan Al  Date:    07/25/2024  Time:    17:46               Narrative:    EXAMINATION:  XR CHEST AP PORTABLE    CLINICAL HISTORY:  .  Palpitations    COMPARISON:  No previous similar chest x-ray available    TECHNIQUE:  Chest x-ray AP portable    FINDINGS:  The cardiomediastinal silhouette and pulmonary vasculature are unremarkable.  Lungs and pleural spaces are clear.  Osseous structures are unremarkable                                    X-Rays:   Independently Interpreted Readings:   Other Readings:  EXAMINATION:  XR CHEST AP PORTABLE     CLINICAL HISTORY:  .  Palpitations     COMPARISON:  No previous similar chest x-ray available     TECHNIQUE:  Chest x-ray AP portable     FINDINGS:  The cardiomediastinal silhouette and pulmonary vasculature are unremarkable.  Lungs and pleural spaces are clear.  Osseous structures are unremarkable     Impression:     No acute process        Electronically signed by:Juan Al  Date:                                            07/25/2024  Time:                                           17:46                Medications   potassium chloride SA CR tablet 40 mEq (40 mEq Oral Given 7/25/24 1932)     Medical  Decision Making  PALPITATIONS AFTER FEVER CHILLS NAUSEA AND DIARRHEA...    DDX:  METABOLIC ABNORMALITY VS OTHER    OUTPATIENT FOLLOW UP    Amount and/or Complexity of Data Reviewed  Labs: ordered. Decision-making details documented in ED Course.  Radiology: ordered. Decision-making details documented in ED Course.  ECG/medicine tests: ordered. Decision-making details documented in ED Course.              Attending Attestation:           Physician Attestation for Scribe:  Physician Attestation Statement for Scribe #1: I, Elpidio Caldera MD, reviewed documentation, as scribed by Anna Grigsby in my presence, and it is both accurate and complete.             ED Course as of 08/18/24 0727   Thu Jul 25, 2024   1756 07/25/24 1749  X-Ray Chest AP Portable  Performed: 07/25/24 1714  Final     [CM]      ED Course User Index  [CM] Anna Grigsby                             Clinical Impression:  Final diagnoses:  [R00.2] Palpitations (Primary)  [E87.6] Hypokalemia          ED Disposition Condition    Discharge Stable          ED Prescriptions    None       Follow-up Information       Follow up With Specialties Details Why Contact Info    Sammi Butt, DERICK, FNP Family Medicine, Emergency Medicine  As needed [address]  [facility] [place] [address]  [phone]               Elpidio Caldera MD  08/18/24 0748

## 2024-07-26 ENCOUNTER — TELEPHONE (OUTPATIENT)
Dept: EMERGENCY MEDICINE | Facility: HOSPITAL | Age: 74
End: 2024-07-26
Payer: MEDICARE

## 2024-07-26 LAB
OHS QRS DURATION: 92 MS
OHS QTC CALCULATION: 421 MS

## 2024-07-29 ENCOUNTER — TELEPHONE (OUTPATIENT)
Dept: EMERGENCY MEDICINE | Facility: HOSPITAL | Age: 74
End: 2024-07-29
Payer: MEDICARE

## 2024-08-01 ENCOUNTER — HOSPITAL ENCOUNTER (OUTPATIENT)
Dept: CARDIOLOGY | Facility: HOSPITAL | Age: 74
Discharge: HOME | End: 2024-08-01
Attending: HOSPITALIST
Payer: MEDICARE

## 2024-08-01 ENCOUNTER — OFFICE VISIT (OUTPATIENT)
Dept: CARDIOLOGY | Facility: CLINIC | Age: 74
End: 2024-08-01
Payer: MEDICARE

## 2024-08-01 VITALS
WEIGHT: 164 LBS | BODY MASS INDEX: 29.06 KG/M2 | SYSTOLIC BLOOD PRESSURE: 112 MMHG | HEIGHT: 63 IN | DIASTOLIC BLOOD PRESSURE: 62 MMHG | OXYGEN SATURATION: 96 % | HEART RATE: 69 BPM

## 2024-08-01 DIAGNOSIS — G47.33 OBSTRUCTIVE SLEEP APNEA: Primary | ICD-10-CM

## 2024-08-01 DIAGNOSIS — R00.2 PALPITATIONS: ICD-10-CM

## 2024-08-01 DIAGNOSIS — E87.6 HYPOKALEMIA: ICD-10-CM

## 2024-08-01 DIAGNOSIS — R53.83 FATIGUE, UNSPECIFIED TYPE: ICD-10-CM

## 2024-08-01 PROCEDURE — 99204 OFFICE O/P NEW MOD 45 MIN: CPT | Mod: S$PBB,,, | Performed by: HOSPITALIST

## 2024-08-01 PROCEDURE — 99999 PR PBB SHADOW E&M-EST. PATIENT-LVL III: CPT | Mod: PBBFAC,,, | Performed by: HOSPITALIST

## 2024-08-01 PROCEDURE — 93246 EXT ECG>7D<15D RECORDING: CPT

## 2024-08-01 PROCEDURE — 99213 OFFICE O/P EST LOW 20 MIN: CPT | Mod: PBBFAC | Performed by: HOSPITALIST

## 2024-08-01 NOTE — PROGRESS NOTES
CARDIOVASCULAR CONSULTATION        REASON FOR CONSULT:   Dayana Bae is a 74 y.o. female who presents for   Chief Complaint   Patient presents with    Consult     Saw heart doctor in 2019 with Dr. Molina ([facility]) and had echo but no stress test.     Palpitations     Maybe 1-2 times since ER and had low potassium and magnesium    Shortness of Breath     Randomly during day. But recently in AM has waves of weakness.   Has Hx of ELIZABETH but has not wore cpap in roughly 3 years          HISTORY OF PRESENT ILLNESS:   74 y.o. female who  has a past medical history of Age-related osteoporosis without current pathological fracture, Cataract, Essential (primary) hypertension, Hard of hearing, Mixed hyperlipidemia, and Sleep apnea, unspecified.    Today we discussed the patient's cardiac symptoms at length.     2wks ago attended a  w/ buffet style lunch, ate casserole around 3-4 hours after event began, then that evening had severe nausea, fever (that night only), diarrhea for a week or so, is still watery, and 3 ER visits in the interim, each w/ profound weakness and each time w/ moderate to severe hypokalemia. She was taking chlorthalidone which was stopped a few days/1 wk ago.     She c/o palpitations both before and since (increased freq) this occurred. She was told in ER something about AFib, though no tele or strips in chart that I see. Discussed 14 day monitor to assess for VE/SVE burden, pAFib, etc.     PAST MEDICAL HISTORY:     Past Medical History:   Diagnosis Date    Age-related osteoporosis without current pathological fracture 2022    Bone density currently shows osteopenia after several years of treatment with Prolia. Pt has not had med in 2 years.       Cataract     Essential (primary) hypertension     Hard of hearing     Mixed hyperlipidemia     Sleep apnea, unspecified        PAST SURGICAL HISTORY:     Past Surgical History:   Procedure Laterality Date    CARPAL TUNNEL RELEASE      TUBAL  LIGATION         ALLERGIES AND MEDICATION:   Review of patient's allergies indicates:  No Known Allergies     Medication List            Accurate as of August 1, 2024 10:09 AM. If you have any questions, ask your nurse or doctor.                CONTINUE taking these medications      potassium chloride 20 mEq  Commonly known as: K-TAB     rosuvastatin 10 MG tablet  Commonly known as: CRESTOR  Take 1 tablet (10 mg total) by mouth every evening.            STOP taking these medications      ondansetron 4 MG Tbdl  Commonly known as: ZOFRAN-ODT  Stopped by: Louis Lozada MD              SOCIAL HISTORY:     Social History     Socioeconomic History    Marital status:     Number of children: 2   Occupational History    Occupation: retired from [employer]   Tobacco Use    Smoking status: Never     Passive exposure: Never    Smokeless tobacco: Never   Substance and Sexual Activity    Alcohol use: Not Currently    Drug use: Never    Sexual activity: Not Currently     Partners: Female, Male     Social Determinants of Health     Financial Resource Strain: Low Risk  (7/23/2024)    Overall Financial Resource Strain (CARDIA)     Difficulty of Paying Living Expenses: Not hard at all   Food Insecurity: No Food Insecurity (7/23/2024)    Hunger Vital Sign     Worried About Running Out of Food in the Last Year: Never true     Ran Out of Food in the Last Year: Never true   Transportation Needs: No Transportation Needs (5/4/2024)    PRAPARE - Transportation     Lack of Transportation (Medical): No     Lack of Transportation (Non-Medical): No   Physical Activity: Insufficiently Active (7/23/2024)    Exercise Vital Sign     Days of Exercise per Week: 2 days     Minutes of Exercise per Session: 20 min   Stress: No Stress Concern Present (7/23/2024)    [employer] - Occupational Stress Questionnaire     Feeling of Stress : Not at all   Housing Stability: Unknown (7/23/2024)    Housing Stability Vital Sign  "    Unable to Pay for Housing in the Last Year: No       FAMILY HISTORY:     Family History   Problem Relation Name Age of Onset    Heart disease Mother Marisela     Heart disease Father Croque     Hypertension Sister      Hypertension Brother      Lung disease Brother      Pancreatic cancer Brother      Pancreatic cancer Brother      Colon cancer Brother          had colon surgery and had a blood clot after stopping a medication and clot was cause of death       REVIEW OF SYSTEMS:       Cardiology focused 12-point ROS otherwise unremarkable except for as mentioned in HPI and A/P.   Pertinent Positives and Negatives documented herein.       PHYSICAL EXAM:     Vitals:    08/01/24 0919   BP: 112/62   Pulse: 69    Body mass index is 29.05 kg/m².  Weight: 74.4 kg (164 lb)   Height: 5' 3" (160 cm)     Gen: NAD  HEENT: NC/AT, MMM  Chest: normal wob  CV: RRR, no m/g/r  Ext: mild/trace edema of BLEs  Skin: no rash  Psych: AMAA  Neuro: CNGI      DATA:     Laboratory:  CBC:  Recent Labs   Lab 10/23/23  0907 05/07/24  1026 07/25/24  1722   WBC 7.09 7.34 5.89   Hemoglobin 14.8 13.9 13.8   Hematocrit 45.2 42.9 41.8   Platelet Count 226 241 191       CHEMISTRIES:  Recent Labs   Lab 01/19/22  0919 07/14/22  1059 01/27/23  1609 05/07/24  1026 07/24/24  1414 07/25/24  1722   Glucose 131 H 100   < > 134 H 113 H 107 H   Sodium 139 142   < > 141 137 133 L   Potassium 3.2 L 3.5   < > 3.0 L 4.2 3.4 L   BUN 15 10   < > 15 12 10   Creatinine 0.89 1.00   < > 1.05 H 1.04 H 0.93   eGFR 66 58 L  --   --   --   --    Calcium 9.1 9.8   < > 9.8 9.6 8.4 L   Magnesium  --   --   --   --  2.1 2.1    < > = values in this interval not displayed.       CARDIAC BIOMARKERS:      No results found for: "BNP"    COAGS:        LIPIDS/LFTS:  Recent Labs   Lab 01/27/23  1609 10/23/23  0907 05/07/24  1026 07/25/24  1722   Cholesterol 135 143 156  --    Triglycerides 157 H 161 H 185 H  --    HDL Cholesterol 64 H 57 60  --    LDL Calculated 40 54 59  --    Non-HDL " 71 86 96  --    AST 19 26 16 23   ALT 24 32 23 31       Hemoglobin A1C   Date Value Ref Range Status   02/08/2022 5.6 4.5 - 6.6 % Final     Comment:       Normal:               <5.7%  Pre-Diabetic:       5.7% to 6.4%  Diabetic:             >6.4%  Diabetic Goal:     <7%       TSH  Recent Labs   Lab 07/25/24  1722   TSH 2.720       The 10-year ASCVD risk score (Iveth FIERRO, et al., 2019) is: 13.9%    Values used to calculate the score:      Age: 74 years      Sex: Female      Is Non- : No      Diabetic: No      Tobacco smoker: No      Systolic Blood Pressure: 112 mmHg      Is BP treated: No      HDL Cholesterol: 60 mg/dL      Total Cholesterol: 156 mg/dL     IMAGING  No orders to display       ASSESSMENT AND PLAN     Patient Active Problem List   Diagnosis    Obstructive sleep apnea    Palpitations         Orders Placed This Encounter   Procedures    Comprehensive Metabolic Panel    Cardiac Monitor - 3-15 Day Adult (Cupid Only)       1. Palpitations  - Ambulatory referral/consult to Cardiology  - Cardiac Monitor - 3-15 Day Adult (Cupid Only); Future  - Comprehensive Metabolic Panel; Future    2. Hypokalemia  - Ambulatory referral/consult to Cardiology  - Cardiac Monitor - 3-15 Day Adult (Cupid Only); Future  - Comprehensive Metabolic Panel; Future    3. Obstructive sleep apnea    Problem Noted   Obstructive Sleep Apnea 8/1/2024    Has ELIZABETH, not on CPAP - recall/mask fit issue.   We discussed repeat sleep study for re-titration of cpap as well mask re fit etc     Palpitations 8/1/2024    Palpitations before and since GI illness after buffet style food (presume viral vs staph aureus) - no blood in stool, no fevers since first night; frequency now 2-3 times daily though still 'loose' at times per patient.     #hypokalemia  -repeat CMP today                This note was dictated with the help of speech recognition software.  There might be un-intended errors and/or substitutions.

## 2024-08-06 ENCOUNTER — OFFICE VISIT (OUTPATIENT)
Dept: FAMILY MEDICINE | Facility: CLINIC | Age: 74
End: 2024-08-06
Payer: MEDICARE

## 2024-08-06 VITALS
BODY MASS INDEX: 29.23 KG/M2 | SYSTOLIC BLOOD PRESSURE: 158 MMHG | WEIGHT: 165 LBS | OXYGEN SATURATION: 98 % | DIASTOLIC BLOOD PRESSURE: 78 MMHG | RESPIRATION RATE: 18 BRPM | HEART RATE: 76 BPM | TEMPERATURE: 98 F | HEIGHT: 63 IN

## 2024-08-06 DIAGNOSIS — E87.6 HYPOKALEMIA: Primary | ICD-10-CM

## 2024-08-06 DIAGNOSIS — E83.42 HYPOMAGNESEMIA: ICD-10-CM

## 2024-08-06 DIAGNOSIS — I10 PRIMARY HYPERTENSION: ICD-10-CM

## 2024-08-06 PROCEDURE — 99214 OFFICE O/P EST MOD 30 MIN: CPT | Mod: ,,, | Performed by: NURSE PRACTITIONER

## 2024-08-29 ENCOUNTER — OFFICE VISIT (OUTPATIENT)
Dept: CARDIOLOGY | Facility: CLINIC | Age: 74
End: 2024-08-29
Payer: MEDICARE

## 2024-08-29 VITALS
DIASTOLIC BLOOD PRESSURE: 80 MMHG | SYSTOLIC BLOOD PRESSURE: 174 MMHG | OXYGEN SATURATION: 96 % | BODY MASS INDEX: 29.38 KG/M2 | WEIGHT: 165.81 LBS | HEART RATE: 70 BPM | HEIGHT: 63 IN

## 2024-08-29 DIAGNOSIS — R00.2 PALPITATIONS: ICD-10-CM

## 2024-08-29 DIAGNOSIS — I10 HYPERTENSION, UNSPECIFIED TYPE: Primary | ICD-10-CM

## 2024-08-29 PROCEDURE — 99999 PR PBB SHADOW E&M-EST. PATIENT-LVL III: CPT | Mod: PBBFAC,,, | Performed by: NURSE PRACTITIONER

## 2024-08-29 PROCEDURE — 99213 OFFICE O/P EST LOW 20 MIN: CPT | Mod: PBBFAC | Performed by: NURSE PRACTITIONER

## 2024-08-29 PROCEDURE — 99214 OFFICE O/P EST MOD 30 MIN: CPT | Mod: S$PBB,,, | Performed by: NURSE PRACTITIONER

## 2024-08-29 RX ORDER — LOSARTAN POTASSIUM 25 MG/1
25 TABLET ORAL DAILY
Qty: 90 TABLET | Refills: 3 | Status: SHIPPED | OUTPATIENT
Start: 2024-08-29 | End: 2025-08-29

## 2024-08-29 NOTE — PROGRESS NOTES
PCP: Sammi Butt, DNP, FNP    Referring Provider:     Subjective:   Dayana Bae is a 74 y.o. female with hx of HTN, HLD, age-related osteoporosis without current pathological fracture, cataract, Hard of hearing, and ELIZABETH (not on CPAP) who presents for follow up.     24: Pt was seen by Dr. Lozada 24. See note below. She had c/o palpitations. ZIO external cardiac monitor was placed. Had an ED visit with hypokalemia - likely due to GI illness at that time. Labs were repeated 24 by Dr. Lozada and potassium had normalized.     ZIO results are grossly normal. Today, pt denies any palpitations or any other cardiac complaints. BP is elevated at 174/80.     Pt has previously followed with Dr. Molina at [facility]. She is accompanied by her son today. They agree to have pt return for BP check and labs in 2 weeks. They will decide whether they would like to continue to be followed here or go back to [facility].     24 (seen by Dr. Lozada): Today we discussed the patient's cardiac symptoms at length.      2wks ago attended a  w/ buffet style lunch, ate casserole around 3-4 hours after event began, then that evening had severe nausea, fever (that night only), diarrhea for a week or so, is still watery, and 3 ER visits in the interim, each w/ profound weakness and each time w/ moderate to severe hypokalemia. She was taking chlorthalidone which was stopped a few days/1 wk ago.      She c/o palpitations both before and since (increased freq) this occurred. She was told in ER something about AFib, though no tele or strips in chart that I see. Discussed 14 day monitor to assess for VE/SVE burden, pAFib, etc.         EKG   Results for orders placed or performed during the hospital encounter of 24   EKG 12-lead    Collection Time: 24  5:36 PM   Result Value Ref Range    QRS Duration 92 ms    OHS QTC Calculation 421 ms    Narrative    Test Reason : R00.2,    Vent. Rate : 065 BPM     Atrial Rate : 000  BPM     P-R Int : 178 ms          QRS Dur : 092 ms      QT Int : 412 ms       P-R-T Axes : 068 015 078 degrees     QTc Int : 421 ms    Sinus rhythm  Possible anterior infarct - age undetermined  Lateral ST-T abnormality is nonspecific  Low QRS voltages in precordial leads  Abnormal ECG    Confirmed by Denisha PANDEY, Richard JOHNSTON ([id number]) on 7/26/2024 5:12:38 PM    Referred By: [email or web address]   SELF           Confirmed By:Richard Denny MD     ECHO No results found for this or any previous visit.    [facility] No results found for this or any previous visit.        Lab Results   Component Value Date     08/01/2024    K 4.7 08/01/2024     (H) 08/01/2024    CO2 30 08/01/2024    BUN 10 08/01/2024    CREATININE 0.98 08/01/2024    CALCIUM 9.6 08/01/2024    ANIONGAP 9 08/01/2024    EGFRNONAA 58 (L) 07/14/2022       Lab Results   Component Value Date    CHOL 156 05/07/2024    CHOL 143 10/23/2023    CHOL 135 01/27/2023     Lab Results   Component Value Date    HDL 60 05/07/2024    HDL 57 10/23/2023    HDL 64 (H) 01/27/2023     Lab Results   Component Value Date    LDLCALC 59 05/07/2024    LDLCALC 54 10/23/2023    LDLCALC 40 01/27/2023     Lab Results   Component Value Date    TRIG 185 (H) 05/07/2024    TRIG 161 (H) 10/23/2023    TRIG 157 (H) 01/27/2023     Lab Results   Component Value Date    CHOLHDL 2.6 05/07/2024    CHOLHDL 2.5 10/23/2023    CHOLHDL 2.1 01/27/2023       Lab Results   Component Value Date    WBC 5.89 07/25/2024    HGB 13.8 07/25/2024    HCT 41.8 07/25/2024    MCV 86.4 07/25/2024     07/25/2024           Current Outpatient Medications:     rosuvastatin (CRESTOR) 10 MG tablet, Take 1 tablet (10 mg total) by mouth every evening., Disp: 90 tablet, Rfl: 1    losartan (COZAAR) 25 MG tablet, Take 1 tablet (25 mg total) by mouth once daily., Disp: 90 tablet, Rfl: 3    Review of Systems   Constitutional:  Negative for chills, diaphoresis, fever and malaise/fatigue.   Respiratory:  Negative for cough and shortness of  "breath.    Cardiovascular:  Negative for chest pain, palpitations, orthopnea, leg swelling and PND.   Gastrointestinal:  Negative for abdominal pain, nausea and vomiting.   Musculoskeletal:  Negative for falls.   Neurological:  Negative for focal weakness and weakness.         Objective:   BP (!) 174/80 (BP Location: Left arm, Patient Position: Sitting)   Pulse 70   Ht 5' 3" (1.6 m)   Wt 75.2 kg (165 lb 12.8 oz)   SpO2 96%   BMI 29.37 kg/m²     Physical Exam  Constitutional:       General: She is not in acute distress.     Appearance: Normal appearance.   Cardiovascular:      Rate and Rhythm: Normal rate and regular rhythm.   Pulmonary:      Effort: Pulmonary effort is normal.      Breath sounds: Normal breath sounds.   Musculoskeletal:      Cervical back: Neck supple. No rigidity.      Right lower leg: No edema.      Left lower leg: No edema.   Skin:     General: Skin is warm and dry.   Neurological:      Mental Status: She is alert.           Assessment:     1. Hypertension, unspecified type  Basic Metabolic Panel      2. Palpitations              Plan:   Palpitations  - resolved  - ZIO results are grossly normal    Hypertension  - BP is elevated  - discontinue chlorthalidone and potassium chloride  - start losartan 25mg daily  - f/u in 2 weeks for BP check nurse and labs    Pt to let us know if she would like to follow here with cardiology or [facility]          "

## 2024-08-29 NOTE — PATIENT INSTRUCTIONS
Start losartan 25mg daily    Follow up in 2 weeks for blood pressure check and labs - bring blood pressure log and blood pressure machine    Call us when you decide which cardiologist you prefer ([facility] vs [facility])

## 2024-08-30 PROBLEM — I10 HYPERTENSION: Status: ACTIVE | Noted: 2024-08-30

## 2024-08-30 NOTE — ASSESSMENT & PLAN NOTE
- BP is elevated  - discontinue chlorthalidone and potassium chloride  - start losartan 25mg daily  - f/u in 2 weeks for BP check nurse and labs

## 2024-11-11 DIAGNOSIS — E78.2 MIXED HYPERLIPIDEMIA: ICD-10-CM

## 2024-11-12 RX ORDER — ROSUVASTATIN CALCIUM 10 MG/1
10 TABLET, COATED ORAL NIGHTLY
Qty: 90 TABLET | Refills: 0 | Status: SHIPPED | OUTPATIENT
Start: 2024-11-12

## 2024-11-22 ENCOUNTER — PATIENT MESSAGE (OUTPATIENT)
Dept: ADMINISTRATIVE | Facility: HOSPITAL | Age: 74
End: 2024-11-22

## 2025-02-12 DIAGNOSIS — E78.2 MIXED HYPERLIPIDEMIA: ICD-10-CM

## 2025-02-13 RX ORDER — ROSUVASTATIN CALCIUM 10 MG/1
10 TABLET, COATED ORAL NIGHTLY
Qty: 30 TABLET | Refills: 0 | Status: SHIPPED | OUTPATIENT
Start: 2025-02-13

## 2025-02-17 ENCOUNTER — PATIENT OUTREACH (OUTPATIENT)
Facility: HOSPITAL | Age: 75
End: 2025-02-17
Payer: MEDICARE

## 2025-02-17 ENCOUNTER — PATIENT MESSAGE (OUTPATIENT)
Dept: ADMINISTRATIVE | Facility: HOSPITAL | Age: 75
End: 2025-02-17

## 2025-02-17 DIAGNOSIS — M85.89 OSTEOPENIA OF MULTIPLE SITES: Primary | ICD-10-CM

## 2025-02-17 NOTE — PROGRESS NOTES
Population Health Chart Review & Patient Outreach Details    Campaign Outreach    Updates Requested / Reviewed:  [x]  Care Team Updated    Health Maintenance Topics Addressed and Outreach Outcomes / Actions Taken:  Osteoporosis Screening [x] Dexa Ordered.

## 2025-02-18 DIAGNOSIS — Z12.31 SCREENING MAMMOGRAM FOR BREAST CANCER: Primary | ICD-10-CM

## 2025-02-18 DIAGNOSIS — M81.0 AGE-RELATED OSTEOPOROSIS WITHOUT CURRENT PATHOLOGICAL FRACTURE: ICD-10-CM

## 2025-03-25 ENCOUNTER — OFFICE VISIT (OUTPATIENT)
Dept: DERMATOLOGY | Facility: CLINIC | Age: 75
End: 2025-03-25
Payer: MEDICARE

## 2025-03-25 DIAGNOSIS — D22.9 MULTIPLE BENIGN NEVI: ICD-10-CM

## 2025-03-25 DIAGNOSIS — L57.0 ACTINIC KERATOSES: Primary | ICD-10-CM

## 2025-03-25 DIAGNOSIS — Z85.828 HISTORY OF NONMELANOMA SKIN CANCER: ICD-10-CM

## 2025-03-25 DIAGNOSIS — L82.1 SEBORRHEIC KERATOSES: ICD-10-CM

## 2025-03-25 RX ORDER — VALSARTAN 160 MG/1
TABLET ORAL
COMMUNITY
Start: 2025-01-06

## 2025-03-25 NOTE — PROGRESS NOTES
[facility]  Andrew Martinez MD    [address] [address]  [phone]    Fax: [phone]    Patient Name: Dayana Bae  Medical Record Number: [id number]  PCP: Sammi Butt DNP, FNP  Age: 74 y.o. : 1950  Contact: [phone] (home) [phone] (work)    CC: lesion on the face  History of Present Illness:     Dayana Bae is a 74 y.o.  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 Pickett in 2018) who presents to clinic today for lesion on the face. It has been present 2 years. Symptoms include scaly.     The patient has no other concerns today.    Review of Systems:     Unremarkable other than mentioned above.     Physical Exam:     General: Relaxed, oriented, alert    Skin examination of the scalp, face, neck, chest, back, abdomen, upper extremities and lower extremities were normal except for as listed below      Assessment and Plan:     1. History of NMSC   Well-healed scar on R hand  No e/o recurrence   Recommend sunscreen and good photoprotection       2. Actinic Keratoses  Erythematous, scaly papules on L cheek, R ear, L ear, R arm    Plan: Liquid Nitrogen.  A total of 5 lesions were treated with liquid nitrogen for 2 freeze-thaw cycles lasting 5 seconds, located on the above locations.   The patient's consent was obtained including but not limited to risks of crusting, scabbing,  blistering, scarring, darker or lighter pigmentary change, recurrence, incomplete removal and infection.    Counseling.  Sun protective clothing and broad spectrum sunscreen can prevent the formation of AK.   AKs can be treated with cryotherapy, photodynamic therapy, imiquimod, topical 5-FU.  Actinic Keratoses are precancerous proliferations that occur within sun damaged skin. If untreated,  a small subset of AKs can develop into Squamous Cell Carcinoma.      3. Benign Nevus   - Dome shaped regular papule    Plan:  Reassurance.    Counseling.  Monthly self-skin checks to monitor for any changes in moles are recommended.  Contact Office if: Any moles change in size, shape or color; itch, burn or bleed.  Discussed use of sunscreen SPF 30 or great     4. Seborrheic keratoses   - brown stuck on appearing papules/plaques  - patient educated on benign nature. No treatment necessary unless they become irritated or inflamed         Andrew Martinez MD   Mohs Surgery/Dermatologic Oncology  Dermatology

## 2025-04-18 LAB
BCS RECOMMENDATION EXT: NORMAL
BMD RECOMMENDATION EXT: NORMAL

## 2025-04-22 ENCOUNTER — RESULTS FOLLOW-UP (OUTPATIENT)
Dept: FAMILY MEDICINE | Facility: CLINIC | Age: 75
End: 2025-04-22